# Patient Record
Sex: MALE | Race: WHITE | NOT HISPANIC OR LATINO | Employment: FULL TIME | URBAN - METROPOLITAN AREA
[De-identification: names, ages, dates, MRNs, and addresses within clinical notes are randomized per-mention and may not be internally consistent; named-entity substitution may affect disease eponyms.]

---

## 2024-02-20 ENCOUNTER — OFFICE VISIT (OUTPATIENT)
Dept: PRIMARY CARE | Facility: CLINIC | Age: 34
End: 2024-02-20
Payer: COMMERCIAL

## 2024-02-20 VITALS
HEIGHT: 70 IN | OXYGEN SATURATION: 96 % | WEIGHT: 186 LBS | HEART RATE: 80 BPM | DIASTOLIC BLOOD PRESSURE: 62 MMHG | BODY MASS INDEX: 26.63 KG/M2 | SYSTOLIC BLOOD PRESSURE: 100 MMHG

## 2024-02-20 DIAGNOSIS — R82.90 BAD ODOR OF URINE: ICD-10-CM

## 2024-02-20 DIAGNOSIS — Z00.00 HEALTH CARE MAINTENANCE: Primary | ICD-10-CM

## 2024-02-20 DIAGNOSIS — R07.81 PLEURITIC CHEST PAIN: ICD-10-CM

## 2024-02-20 LAB
POC APPEARANCE, URINE: CLEAR
POC BILIRUBIN, URINE: NEGATIVE
POC BLOOD, URINE: NEGATIVE
POC COLOR, URINE: YELLOW
POC GLUCOSE, URINE: NEGATIVE MG/DL
POC KETONES, URINE: NEGATIVE MG/DL
POC LEUKOCYTES, URINE: NEGATIVE
POC NITRITE,URINE: NEGATIVE
POC PH, URINE: 7 PH
POC PROTEIN, URINE: NEGATIVE MG/DL
POC SPECIFIC GRAVITY, URINE: 1.01
POC UROBILINOGEN, URINE: 0.2 EU/DL

## 2024-02-20 PROCEDURE — 99395 PREV VISIT EST AGE 18-39: CPT | Performed by: INTERNAL MEDICINE

## 2024-02-20 PROCEDURE — 81002 URINALYSIS NONAUTO W/O SCOPE: CPT | Performed by: INTERNAL MEDICINE

## 2024-02-20 PROCEDURE — 4004F PT TOBACCO SCREEN RCVD TLK: CPT | Performed by: INTERNAL MEDICINE

## 2024-02-20 RX ORDER — CLONAZEPAM 0.5 MG/1
0.5 TABLET ORAL DAILY
COMMUNITY
Start: 2024-02-17

## 2024-02-20 RX ORDER — LAMOTRIGINE 200 MG/1
400 TABLET ORAL DAILY
COMMUNITY
Start: 2024-01-26

## 2024-02-20 RX ORDER — QUETIAPINE 300 MG/1
600 TABLET, FILM COATED, EXTENDED RELEASE ORAL DAILY
COMMUNITY
Start: 2024-01-26

## 2024-02-20 ASSESSMENT — PATIENT HEALTH QUESTIONNAIRE - PHQ9
1. LITTLE INTEREST OR PLEASURE IN DOING THINGS: NOT AT ALL
2. FEELING DOWN, DEPRESSED OR HOPELESS: NOT AT ALL
SUM OF ALL RESPONSES TO PHQ9 QUESTIONS 1 AND 2: 0

## 2024-02-20 NOTE — PROGRESS NOTES
"Reason for Visit: Annual Physical Exam  Allergies and Medications  Cephalosporins  Current Outpatient Medications   Medication Instructions    clonazePAM (KLONOPIN) 0.5 mg, oral, Daily    lamoTRIgine (LAMICTAL) 400 mg, oral, Daily    QUEtiapine XR (SEROQUEL XR) 600 mg, oral, Daily     HPI:    33-year-old patient presented to the office today to establish care examination.  Patient just walked from [place] to [facility] and is planning on moving back.    Patient is actively followed by psychiatry for his anxiety he seems to be stable currently on his current medications I believe he also has bipolar disorder.    He denies any chest pain or difficulty breathing not even on exertion he denies abdominal pain nausea vomiting changes in the bowel movements or blood in the he denies any burning sensation increased frequency but he feels that the urine does not look normal and sometimes it is foamy and sometimes there is a green color in it.  He is a bit concerned.  No blood noted in the urine.  No weight loss loss of appetite.      ROS otherwise negative aside from what was mentioned above in HPI.    Vitals  /62 (BP Location: Left arm, Patient Position: Sitting)   Pulse 80   Ht 1.778 m (5' 10\")   Wt 84.4 kg (186 lb)   SpO2 96%   BMI 26.69 kg/m²   Body mass index is 26.69 kg/m².  Physical Exam  Physical Exam  Constitutional:       Appearance: Normal appearance.   HENT:      Head: Normocephalic and atraumatic.   Eyes:      Extraocular Movements: Extraocular movements intact.      Pupils: Pupils are equal, round, and reactive to light.   Cardiovascular:      Rate and Rhythm: Normal rate and regular rhythm.      Pulses: Normal pulses.      Heart sounds: Normal heart sounds.   Pulmonary:      Effort: Pulmonary effort is normal.      Breath sounds: Normal breath sounds.   Abdominal:      General: Abdomen is flat. Bowel sounds are normal.      Palpations: Abdomen is soft.   Neurological:      General: No " focal deficit present.      Mental Status: He is alert. Mental status is at baseline.   Psychiatric:         Mood and Affect: Mood normal.         Behavior: Behavior normal.         Thought Content: Thought content normal.         Judgment: Judgment normal.          Active Problem List    Comprehensive Medical/Surgical/Social/Family History  No past medical history on file.  No past surgical history on file.  Social History     Social History Narrative    Not on file   Patient continues to smoke and does not drink alcohol not much caffeine.  He is not  and no children.  No family history on file.   Family history:  Patient has 3 brothers in good health he has a grandfather on his mother side cancer he does not want to his father's family.    Assessment and Plan:  Problem List Items Addressed This Visit    None  Visit Diagnoses       Bad odor of urine        Relevant Orders    POCT UA (nonautomated) manually resulted (Completed)        33-year-old patient with the following issues.    1.  Concerns regarding urinary symptoms at this point his urine analysis came back negative for infection, we are going to review his blood work and do a referral to see urology to address this specific concern that he is describing today regarding his urine.    2.  Occasional episodes of sharp shooting pain in the chest very infrequent yet symptoms he does feel that it does happen when he takes a deep breath.  We are going to obtain a D-dimer it is likely musculoskeletal and is not related to exertion we will discuss results with him once available.    3.  Health maintenance issues, lab work will be requested patient is up-to-date on his preventive care and we discussed the results of the most.    Disposition I will see the patient back in the office in 1 year for repeat physical in 3 to 6 months for follow-up.  All his questions were addressed no other active issues or concerns.

## 2024-02-21 ENCOUNTER — LAB (OUTPATIENT)
Dept: LAB | Facility: LAB | Age: 34
End: 2024-02-21
Payer: COMMERCIAL

## 2024-02-21 DIAGNOSIS — Z00.00 HEALTH CARE MAINTENANCE: ICD-10-CM

## 2024-02-21 DIAGNOSIS — R07.81 PLEURITIC CHEST PAIN: ICD-10-CM

## 2024-02-21 LAB
ALBUMIN SERPL BCP-MCNC: 4.2 G/DL (ref 3.4–5)
ALP SERPL-CCNC: 108 U/L (ref 33–120)
ALT SERPL W P-5'-P-CCNC: 24 U/L (ref 10–52)
ANION GAP SERPL CALC-SCNC: 11 MMOL/L (ref 10–20)
AST SERPL W P-5'-P-CCNC: 17 U/L (ref 9–39)
BILIRUB SERPL-MCNC: 0.3 MG/DL (ref 0–1.2)
BUN SERPL-MCNC: 12 MG/DL (ref 6–23)
CALCIUM SERPL-MCNC: 9.2 MG/DL (ref 8.6–10.3)
CHLORIDE SERPL-SCNC: 107 MMOL/L (ref 98–107)
CHOLEST SERPL-MCNC: 206 MG/DL (ref 0–199)
CHOLESTEROL/HDL RATIO: 4.2
CO2 SERPL-SCNC: 28 MMOL/L (ref 21–32)
CREAT SERPL-MCNC: 1.34 MG/DL (ref 0.5–1.3)
D DIMER PPP FEU-MCNC: <215 NG/ML FEU
EGFRCR SERPLBLD CKD-EPI 2021: 71 ML/MIN/1.73M*2
ERYTHROCYTE [DISTWIDTH] IN BLOOD BY AUTOMATED COUNT: 11.9 % (ref 11.5–14.5)
GLUCOSE SERPL-MCNC: 102 MG/DL (ref 74–99)
HCT VFR BLD AUTO: 41 % (ref 41–52)
HDLC SERPL-MCNC: 48.9 MG/DL
HGB BLD-MCNC: 14.1 G/DL (ref 13.5–17.5)
LDLC SERPL CALC-MCNC: 118 MG/DL
MCH RBC QN AUTO: 30.8 PG (ref 26–34)
MCHC RBC AUTO-ENTMCNC: 34.4 G/DL (ref 32–36)
MCV RBC AUTO: 90 FL (ref 80–100)
NON HDL CHOLESTEROL: 157 MG/DL (ref 0–149)
NRBC BLD-RTO: 0 /100 WBCS (ref 0–0)
PLATELET # BLD AUTO: 256 X10*3/UL (ref 150–450)
POTASSIUM SERPL-SCNC: 4.4 MMOL/L (ref 3.5–5.3)
PROT SERPL-MCNC: 6.4 G/DL (ref 6.4–8.2)
RBC # BLD AUTO: 4.58 X10*6/UL (ref 4.5–5.9)
SODIUM SERPL-SCNC: 142 MMOL/L (ref 136–145)
TRIGL SERPL-MCNC: 196 MG/DL (ref 0–149)
TSH SERPL-ACNC: 2.93 MIU/L (ref 0.44–3.98)
VLDL: 39 MG/DL (ref 0–40)
WBC # BLD AUTO: 6.6 X10*3/UL (ref 4.4–11.3)

## 2024-02-21 PROCEDURE — 80053 COMPREHEN METABOLIC PANEL: CPT

## 2024-02-21 PROCEDURE — 36415 COLL VENOUS BLD VENIPUNCTURE: CPT

## 2024-02-21 PROCEDURE — 85027 COMPLETE CBC AUTOMATED: CPT

## 2024-02-21 PROCEDURE — 80061 LIPID PANEL: CPT

## 2024-02-21 PROCEDURE — 84443 ASSAY THYROID STIM HORMONE: CPT

## 2024-02-21 PROCEDURE — 85379 FIBRIN DEGRADATION QUANT: CPT

## 2024-02-22 DIAGNOSIS — R79.89 ELEVATED SERUM CREATININE: Primary | ICD-10-CM

## 2024-02-27 ENCOUNTER — OFFICE VISIT (OUTPATIENT)
Dept: NEPHROLOGY | Facility: CLINIC | Age: 34
End: 2024-02-27
Payer: COMMERCIAL

## 2024-02-27 VITALS
BODY MASS INDEX: 26.2 KG/M2 | HEART RATE: 92 BPM | DIASTOLIC BLOOD PRESSURE: 60 MMHG | WEIGHT: 183 LBS | HEIGHT: 70 IN | SYSTOLIC BLOOD PRESSURE: 102 MMHG

## 2024-02-27 DIAGNOSIS — R79.89 ELEVATED SERUM CREATININE: ICD-10-CM

## 2024-02-27 PROCEDURE — 4004F PT TOBACCO SCREEN RCVD TLK: CPT | Performed by: INTERNAL MEDICINE

## 2024-02-27 PROCEDURE — 99203 OFFICE O/P NEW LOW 30 MIN: CPT | Performed by: INTERNAL MEDICINE

## 2024-02-27 NOTE — PROGRESS NOTES
Gage Hernandes   33 y.o.      Vitals:    02/27/24 1516   Weight: 83 kg (183 lb)      MRN/Room: [id number]/Room/bed info not found      History Of Present Illness  Gage Hernandes is a 33 y.o. male presenting with high creatinine level.     Past Medical History  He has no past medical history on file.    Surgical History  He has no past surgical history on file.     Social History  He reports that he has quit smoking. His smoking use included cigarettes. He uses smokeless tobacco. He reports that he does not currently use alcohol. He reports that he does not use drugs.    Family History  No family history on file.     Allergies  Cephalosporins    Meds:         Current Outpatient Medications   Medication Sig Dispense Refill    clonazePAM (KlonoPIN) 0.5 mg tablet Take 1 tablet (0.5 mg) by mouth once daily.      lamoTRIgine (LaMICtal) 200 mg tablet Take 2 tablets (400 mg) by mouth once daily.      QUEtiapine XR (SEROquel XR) 300 mg 24 hr tablet Take 2 tablets (600 mg) by mouth once daily.       No current facility-administered medications for this visit.         ROS:  The patient is awake and oriented. No dizziness or lightheadedness. No chills and no fever. No headaches. No nausea and no vomiting. No shortness of breath. No cough. No sputum. No chest pain. No chest tightness. No abdominal pain. No diarrhea and no constipation. No hematemesis or hemoptysis. No hematuria. No rectal bleeding. No melena. No epistaxis. No urinary symptoms. No flank pain. No leg edema. No leg pain. No weakness. No itching. Overall, the rest of the review of systems is also negative.  12 point review of systems otherwise negative as stated in HPI.        Physical Exam:        Vitals:    02/27/24 1516   BP: 102/60   Pulse: 92     General: The patient is awake, oriented, and is not in any distress.  Head and Neck: Normocephalic. No periorbital edema.  Respiratory: Symmetric air entry. Symmetric chest expansion.No respiratory  distress.  Cardiovascular: Symmetric peripheral pulses.  Skin: No maculopapular rash.  Musculoskeletal: No peripheral edema in both left and right upper extremities.  No edema in either left or right lower extremities.  Neuro Exam: Speech is fluent. Moves extremities.        Blood Labs:  No results found for this or any previous visit (from the past 24 hour(s)).   Lab Results   Component Value Date    GLUCOSE 102 (H) 02/21/2024    CALCIUM 9.2 02/21/2024     02/21/2024    K 4.4 02/21/2024    CO2 28 02/21/2024     02/21/2024    BUN 12 02/21/2024    CREATININE 1.34 (H) 02/21/2024       Imaging:        Assessment and Plan:  #1 elevated creatinine level.  Recent blood work showed creatinine level of 1.3.  The patient complains of urine discoloration and he states his urine is greenish in color.  I asked for microscopic urinalysis and spot urine protein to creatinine ratio.  Basic metabolic panel will be checked.  I asked for a kidney ultrasound.  Blood pressure is good.  No history of hypertension or diabetes.    I will see him in about 2 to 3 weeks for follow-up.    Poncho Crowley MD

## 2024-02-29 ENCOUNTER — HOSPITAL ENCOUNTER (OUTPATIENT)
Dept: RADIOLOGY | Facility: CLINIC | Age: 34
Discharge: HOME | End: 2024-02-29
Payer: COMMERCIAL

## 2024-02-29 ENCOUNTER — LAB (OUTPATIENT)
Dept: LAB | Facility: LAB | Age: 34
End: 2024-02-29
Payer: COMMERCIAL

## 2024-02-29 DIAGNOSIS — R79.89 ELEVATED SERUM CREATININE: ICD-10-CM

## 2024-02-29 LAB
ANION GAP SERPL CALC-SCNC: 12 MMOL/L (ref 10–20)
BUN SERPL-MCNC: 12 MG/DL (ref 6–23)
CALCIUM SERPL-MCNC: 9.7 MG/DL (ref 8.6–10.3)
CHLORIDE SERPL-SCNC: 102 MMOL/L (ref 98–107)
CO2 SERPL-SCNC: 29 MMOL/L (ref 21–32)
CREAT SERPL-MCNC: 1.41 MG/DL (ref 0.5–1.3)
CREAT UR-MCNC: 24.6 MG/DL (ref 20–370)
EGFRCR SERPLBLD CKD-EPI 2021: 67 ML/MIN/1.73M*2
GLUCOSE SERPL-MCNC: 104 MG/DL (ref 74–99)
POTASSIUM SERPL-SCNC: 4.2 MMOL/L (ref 3.5–5.3)
PROT UR-ACNC: <4 MG/DL (ref 5–25)
PROT/CREAT UR: ABNORMAL MG/G{CREAT}
RBC #/AREA URNS AUTO: NORMAL /HPF
SODIUM SERPL-SCNC: 139 MMOL/L (ref 136–145)
WBC #/AREA URNS AUTO: NORMAL /HPF

## 2024-02-29 PROCEDURE — 84156 ASSAY OF PROTEIN URINE: CPT

## 2024-02-29 PROCEDURE — 36415 COLL VENOUS BLD VENIPUNCTURE: CPT

## 2024-02-29 PROCEDURE — 82570 ASSAY OF URINE CREATININE: CPT

## 2024-02-29 PROCEDURE — 76770 US EXAM ABDO BACK WALL COMP: CPT

## 2024-02-29 PROCEDURE — 81001 URINALYSIS AUTO W/SCOPE: CPT

## 2024-02-29 PROCEDURE — 87086 URINE CULTURE/COLONY COUNT: CPT

## 2024-02-29 PROCEDURE — 80048 BASIC METABOLIC PNL TOTAL CA: CPT

## 2024-02-29 PROCEDURE — 76770 US EXAM ABDO BACK WALL COMP: CPT | Performed by: STUDENT IN AN ORGANIZED HEALTH CARE EDUCATION/TRAINING PROGRAM

## 2024-03-01 LAB — BACTERIA UR CULT: NO GROWTH

## 2024-03-11 ENCOUNTER — OFFICE VISIT (OUTPATIENT)
Dept: PRIMARY CARE | Facility: CLINIC | Age: 34
End: 2024-03-11
Payer: COMMERCIAL

## 2024-03-11 VITALS
OXYGEN SATURATION: 98 % | HEIGHT: 70 IN | SYSTOLIC BLOOD PRESSURE: 120 MMHG | DIASTOLIC BLOOD PRESSURE: 70 MMHG | WEIGHT: 178.2 LBS | HEART RATE: 81 BPM | BODY MASS INDEX: 25.51 KG/M2 | RESPIRATION RATE: 14 BRPM

## 2024-03-11 DIAGNOSIS — R82.90 URINE ABNORMALITY: Primary | ICD-10-CM

## 2024-03-11 DIAGNOSIS — R19.4 CHANGE IN BOWEL HABITS: ICD-10-CM

## 2024-03-11 DIAGNOSIS — R10.84 GENERALIZED ABDOMINAL PAIN: ICD-10-CM

## 2024-03-11 PROCEDURE — 99214 OFFICE O/P EST MOD 30 MIN: CPT | Performed by: NURSE PRACTITIONER

## 2024-03-11 PROCEDURE — 4004F PT TOBACCO SCREEN RCVD TLK: CPT | Performed by: NURSE PRACTITIONER

## 2024-03-11 NOTE — PROGRESS NOTES
"Subjective   Patient ID: Gage Hernandes is a 33 y.o. male who presents for GI Problem (Pt has c.o yellow stools, green urine, bad smelling urine, abdominal pain when hungry, loss of appetite, fluctuating weight, and bloating. Pt also admits to nausea with any alcoholic beverage. Admits to excessive hair loss.//Pt had US and blood work to check kidneys. ).    He complains of yellowish stool which is daily. The other thing is he describes a concentrated substance is leaking in while he is urinating. While urinating he states there is a cloudy substance that comes into his urine.  He states there is a sharp pain when he doesn't eat anything in the epigastric region. The only time he's really hungry is right after he takes his seroquel at night. Doesn't feel like acid reflux. Not a burning pain.  Pain is relieved with eating.  Not taking any new supplements.   Started 3-4 months ago. Didn't have insurance last year so had to wait until Jan to see anyone for this.    Went to nephrology and kidney US and urine testing was normal.          Review of Systems  otherwise negative aside from what was mentioned above in HPI.    Objective   /70   Pulse 81   Resp 14   Ht 1.778 m (5' 10\")   Wt 80.8 kg (178 lb 3.2 oz)   SpO2 98%   BMI 25.57 kg/m²     Physical Exam  Constitutional:       Appearance: Normal appearance.   Cardiovascular:      Rate and Rhythm: Normal rate and regular rhythm.   Pulmonary:      Effort: Pulmonary effort is normal.      Breath sounds: Normal breath sounds.   Abdominal:      General: Abdomen is flat. Bowel sounds are normal.      Palpations: Abdomen is soft. There is no mass.      Tenderness: There is no abdominal tenderness. There is no guarding.   Skin:     General: Skin is warm.   Neurological:      General: No focal deficit present.      Mental Status: He is alert and oriented to person, place, and time. Mental status is at baseline.   Psychiatric:         Mood and Affect: Mood normal.    "      Thought Content: Thought content normal.         Assessment/Plan   Problem List Items Addressed This Visit    None  Visit Diagnoses         Codes    Urine abnormality    -  Primary R82.90    Relevant Orders    Referral to Urology    Change in bowel habits     R19.4    Relevant Orders    Referral to Gastroenterology    Generalized abdominal pain     R10.84    Relevant Orders    Referral to Gastroenterology    US abdomen

## 2024-03-12 ENCOUNTER — ANESTHESIA (OUTPATIENT)
Dept: OPERATING ROOM | Facility: HOSPITAL | Age: 34
End: 2024-03-12
Payer: COMMERCIAL

## 2024-03-12 ENCOUNTER — HOSPITAL ENCOUNTER (EMERGENCY)
Facility: HOSPITAL | Age: 34
Discharge: HOME | End: 2024-03-12
Attending: EMERGENCY MEDICINE
Payer: COMMERCIAL

## 2024-03-12 ENCOUNTER — ANESTHESIA EVENT (OUTPATIENT)
Dept: OPERATING ROOM | Facility: HOSPITAL | Age: 34
End: 2024-03-12
Payer: COMMERCIAL

## 2024-03-12 ENCOUNTER — APPOINTMENT (OUTPATIENT)
Dept: RADIOLOGY | Facility: HOSPITAL | Age: 34
End: 2024-03-12
Payer: COMMERCIAL

## 2024-03-12 VITALS
WEIGHT: 170 LBS | RESPIRATION RATE: 18 BRPM | SYSTOLIC BLOOD PRESSURE: 115 MMHG | OXYGEN SATURATION: 97 % | DIASTOLIC BLOOD PRESSURE: 65 MMHG | HEART RATE: 51 BPM | TEMPERATURE: 97.2 F | BODY MASS INDEX: 24.34 KG/M2 | HEIGHT: 70 IN

## 2024-03-12 DIAGNOSIS — N20.1 URETERAL STONE: Primary | ICD-10-CM

## 2024-03-12 DIAGNOSIS — N20.0 NEPHROLITHIASIS: ICD-10-CM

## 2024-03-12 LAB
ALBUMIN SERPL BCP-MCNC: 4.7 G/DL (ref 3.4–5)
ALP SERPL-CCNC: 92 U/L (ref 33–120)
ALT SERPL W P-5'-P-CCNC: 19 U/L (ref 10–52)
ANION GAP SERPL CALC-SCNC: 11 MMOL/L (ref 10–20)
APPEARANCE UR: CLEAR
AST SERPL W P-5'-P-CCNC: 17 U/L (ref 9–39)
BASOPHILS # BLD AUTO: 0.05 X10*3/UL (ref 0–0.1)
BASOPHILS NFR BLD AUTO: 0.8 %
BILIRUB SERPL-MCNC: 0.7 MG/DL (ref 0–1.2)
BILIRUB UR STRIP.AUTO-MCNC: NEGATIVE MG/DL
BUN SERPL-MCNC: 16 MG/DL (ref 6–23)
CALCIUM SERPL-MCNC: 9.4 MG/DL (ref 8.6–10.3)
CHLORIDE SERPL-SCNC: 103 MMOL/L (ref 98–107)
CO2 SERPL-SCNC: 24 MMOL/L (ref 21–32)
COLOR UR: YELLOW
CREAT SERPL-MCNC: 1.39 MG/DL (ref 0.5–1.3)
CREAT UR-MCNC: 118.7 MG/DL (ref 20–370)
CREAT UR-MCNC: 118.7 MG/DL (ref 20–370)
EGFRCR SERPLBLD CKD-EPI 2021: 68 ML/MIN/1.73M*2
EOSINOPHIL # BLD AUTO: 0.18 X10*3/UL (ref 0–0.7)
EOSINOPHIL NFR BLD AUTO: 2.9 %
ERYTHROCYTE [DISTWIDTH] IN BLOOD BY AUTOMATED COUNT: 11.9 % (ref 11.5–14.5)
GLUCOSE SERPL-MCNC: 113 MG/DL (ref 74–99)
GLUCOSE UR STRIP.AUTO-MCNC: NEGATIVE MG/DL
HCT VFR BLD AUTO: 40 % (ref 41–52)
HGB BLD-MCNC: 14 G/DL (ref 13.5–17.5)
HOLD SPECIMEN: NORMAL
IMM GRANULOCYTES # BLD AUTO: 0.01 X10*3/UL (ref 0–0.7)
IMM GRANULOCYTES NFR BLD AUTO: 0.2 % (ref 0–0.9)
KETONES UR STRIP.AUTO-MCNC: NEGATIVE MG/DL
LEUKOCYTE ESTERASE UR QL STRIP.AUTO: NEGATIVE
LIPASE SERPL-CCNC: 31 U/L (ref 9–82)
LYMPHOCYTES # BLD AUTO: 2.36 X10*3/UL (ref 1.2–4.8)
LYMPHOCYTES NFR BLD AUTO: 37.5 %
MCH RBC QN AUTO: 30.5 PG (ref 26–34)
MCHC RBC AUTO-ENTMCNC: 35 G/DL (ref 32–36)
MCV RBC AUTO: 87 FL (ref 80–100)
MONOCYTES # BLD AUTO: 0.52 X10*3/UL (ref 0.1–1)
MONOCYTES NFR BLD AUTO: 8.3 %
NEUTROPHILS # BLD AUTO: 3.17 X10*3/UL (ref 1.2–7.7)
NEUTROPHILS NFR BLD AUTO: 50.3 %
NITRITE UR QL STRIP.AUTO: NEGATIVE
NRBC BLD-RTO: 0 /100 WBCS (ref 0–0)
PH UR STRIP.AUTO: 5 [PH]
PLATELET # BLD AUTO: 280 X10*3/UL (ref 150–450)
POTASSIUM SERPL-SCNC: 3.8 MMOL/L (ref 3.5–5.3)
PROT SERPL-MCNC: 7 G/DL (ref 6.4–8.2)
PROT UR STRIP.AUTO-MCNC: NEGATIVE MG/DL
PROT UR-ACNC: 10 MG/DL (ref 5–25)
PROT/CREAT UR: 0.08 MG/MG CREAT (ref 0–0.17)
RBC # BLD AUTO: 4.59 X10*6/UL (ref 4.5–5.9)
RBC # UR STRIP.AUTO: ABNORMAL /UL
RBC #/AREA URNS AUTO: ABNORMAL /HPF
SODIUM SERPL-SCNC: 134 MMOL/L (ref 136–145)
SODIUM UR-SCNC: 42 MMOL/L
SODIUM/CREAT UR-RTO: 35 MMOL/G CREAT
SP GR UR STRIP.AUTO: 1.03
UROBILINOGEN UR STRIP.AUTO-MCNC: <2 MG/DL
WBC # BLD AUTO: 6.3 X10*3/UL (ref 4.4–11.3)
WBC #/AREA URNS AUTO: ABNORMAL /HPF

## 2024-03-12 PROCEDURE — 99284 EMERGENCY DEPT VISIT MOD MDM: CPT

## 2024-03-12 PROCEDURE — 84156 ASSAY OF PROTEIN URINE: CPT

## 2024-03-12 PROCEDURE — C2617 STENT, NON-COR, TEM W/O DEL: HCPCS | Performed by: UROLOGY

## 2024-03-12 PROCEDURE — 2720000007 HC OR 272 NO HCPCS: Performed by: UROLOGY

## 2024-03-12 PROCEDURE — 2500000004 HC RX 250 GENERAL PHARMACY W/ HCPCS (ALT 636 FOR OP/ED)

## 2024-03-12 PROCEDURE — 3600000008 HC OR TIME - EACH INCREMENTAL 1 MINUTE - PROCEDURE LEVEL THREE: Performed by: UROLOGY

## 2024-03-12 PROCEDURE — 7100000002 HC RECOVERY ROOM TIME - EACH INCREMENTAL 1 MINUTE: Performed by: UROLOGY

## 2024-03-12 PROCEDURE — 2500000004 HC RX 250 GENERAL PHARMACY W/ HCPCS (ALT 636 FOR OP/ED): Performed by: ANESTHESIOLOGY

## 2024-03-12 PROCEDURE — 2550000001 HC RX 255 CONTRASTS: Performed by: UROLOGY

## 2024-03-12 PROCEDURE — C1758 CATHETER, URETERAL: HCPCS | Performed by: UROLOGY

## 2024-03-12 PROCEDURE — 2550000001 HC RX 255 CONTRASTS: Performed by: EMERGENCY MEDICINE

## 2024-03-12 PROCEDURE — A52332 PR CYSTOSCOPY,INSERT URETERAL STENT: Performed by: NURSE ANESTHETIST, CERTIFIED REGISTERED

## 2024-03-12 PROCEDURE — 96375 TX/PRO/DX INJ NEW DRUG ADDON: CPT | Mod: 59 | Performed by: INTERNAL MEDICINE

## 2024-03-12 PROCEDURE — 7100000009 HC PHASE TWO TIME - INITIAL BASE CHARGE: Performed by: UROLOGY

## 2024-03-12 PROCEDURE — A52332 PR CYSTOSCOPY,INSERT URETERAL STENT: Performed by: ANESTHESIOLOGY

## 2024-03-12 PROCEDURE — 36415 COLL VENOUS BLD VENIPUNCTURE: CPT | Performed by: EMERGENCY MEDICINE

## 2024-03-12 PROCEDURE — 7100000010 HC PHASE TWO TIME - EACH INCREMENTAL 1 MINUTE: Performed by: UROLOGY

## 2024-03-12 PROCEDURE — 74177 CT ABD & PELVIS W/CONTRAST: CPT

## 2024-03-12 PROCEDURE — C1894 INTRO/SHEATH, NON-LASER: HCPCS | Performed by: UROLOGY

## 2024-03-12 PROCEDURE — 81001 URINALYSIS AUTO W/SCOPE: CPT | Performed by: EMERGENCY MEDICINE

## 2024-03-12 PROCEDURE — 84075 ASSAY ALKALINE PHOSPHATASE: CPT | Performed by: EMERGENCY MEDICINE

## 2024-03-12 PROCEDURE — 96374 THER/PROPH/DIAG INJ IV PUSH: CPT | Mod: 59 | Performed by: INTERNAL MEDICINE

## 2024-03-12 PROCEDURE — 2500000004 HC RX 250 GENERAL PHARMACY W/ HCPCS (ALT 636 FOR OP/ED): Performed by: EMERGENCY MEDICINE

## 2024-03-12 PROCEDURE — 99285 EMERGENCY DEPT VISIT HI MDM: CPT | Mod: 25 | Performed by: INTERNAL MEDICINE

## 2024-03-12 PROCEDURE — 99285 EMERGENCY DEPT VISIT HI MDM: CPT | Performed by: EMERGENCY MEDICINE

## 2024-03-12 PROCEDURE — 3700000002 HC GENERAL ANESTHESIA TIME - EACH INCREMENTAL 1 MINUTE: Performed by: UROLOGY

## 2024-03-12 PROCEDURE — 83690 ASSAY OF LIPASE: CPT

## 2024-03-12 PROCEDURE — 2780000003 HC OR 278 NO HCPCS: Performed by: UROLOGY

## 2024-03-12 PROCEDURE — 3600000003 HC OR TIME - INITIAL BASE CHARGE - PROCEDURE LEVEL THREE: Performed by: UROLOGY

## 2024-03-12 PROCEDURE — 2500000005 HC RX 250 GENERAL PHARMACY W/O HCPCS: Performed by: NURSE ANESTHETIST, CERTIFIED REGISTERED

## 2024-03-12 PROCEDURE — 85025 COMPLETE CBC W/AUTO DIFF WBC: CPT | Performed by: EMERGENCY MEDICINE

## 2024-03-12 PROCEDURE — C1769 GUIDE WIRE: HCPCS | Performed by: UROLOGY

## 2024-03-12 PROCEDURE — 84300 ASSAY OF URINE SODIUM: CPT

## 2024-03-12 PROCEDURE — 2500000004 HC RX 250 GENERAL PHARMACY W/ HCPCS (ALT 636 FOR OP/ED): Performed by: NURSE ANESTHETIST, CERTIFIED REGISTERED

## 2024-03-12 PROCEDURE — 3700000001 HC GENERAL ANESTHESIA TIME - INITIAL BASE CHARGE: Performed by: UROLOGY

## 2024-03-12 PROCEDURE — 76000 FLUOROSCOPY <1 HR PHYS/QHP: CPT

## 2024-03-12 PROCEDURE — 2500000001 HC RX 250 WO HCPCS SELF ADMINISTERED DRUGS (ALT 637 FOR MEDICARE OP): Performed by: ANESTHESIOLOGY

## 2024-03-12 PROCEDURE — 7100000001 HC RECOVERY ROOM TIME - INITIAL BASE CHARGE: Performed by: UROLOGY

## 2024-03-12 DEVICE — URETERAL STENT
Type: IMPLANTABLE DEVICE | Site: URETER | Status: FUNCTIONAL
Brand: CONTOUR™

## 2024-03-12 RX ORDER — ONDANSETRON HYDROCHLORIDE 2 MG/ML
4 INJECTION, SOLUTION INTRAVENOUS EVERY 8 HOURS PRN
Status: CANCELLED | OUTPATIENT
Start: 2024-03-12

## 2024-03-12 RX ORDER — ALBUTEROL SULFATE 0.83 MG/ML
2.5 SOLUTION RESPIRATORY (INHALATION)
Status: DISCONTINUED | OUTPATIENT
Start: 2024-03-12 | End: 2024-03-12 | Stop reason: HOSPADM

## 2024-03-12 RX ORDER — CIPROFLOXACIN 2 MG/ML
INJECTION, SOLUTION INTRAVENOUS CONTINUOUS PRN
Status: DISCONTINUED | OUTPATIENT
Start: 2024-03-12 | End: 2024-03-12

## 2024-03-12 RX ORDER — FENTANYL CITRATE 50 UG/ML
INJECTION, SOLUTION INTRAMUSCULAR; INTRAVENOUS AS NEEDED
Status: DISCONTINUED | OUTPATIENT
Start: 2024-03-12 | End: 2024-03-12

## 2024-03-12 RX ORDER — SODIUM CHLORIDE 9 MG/ML
100 INJECTION, SOLUTION INTRAVENOUS CONTINUOUS
Status: DISCONTINUED | OUTPATIENT
Start: 2024-03-12 | End: 2024-03-12 | Stop reason: HOSPADM

## 2024-03-12 RX ORDER — KETOROLAC TROMETHAMINE 15 MG/ML
15 INJECTION, SOLUTION INTRAMUSCULAR; INTRAVENOUS ONCE
Status: COMPLETED | OUTPATIENT
Start: 2024-03-12 | End: 2024-03-12

## 2024-03-12 RX ORDER — MORPHINE SULFATE 4 MG/ML
4 INJECTION, SOLUTION INTRAMUSCULAR; INTRAVENOUS ONCE
Status: COMPLETED | OUTPATIENT
Start: 2024-03-12 | End: 2024-03-12

## 2024-03-12 RX ORDER — DIPHENHYDRAMINE HYDROCHLORIDE 50 MG/ML
12.5 INJECTION INTRAMUSCULAR; INTRAVENOUS ONCE AS NEEDED
Status: DISCONTINUED | OUTPATIENT
Start: 2024-03-12 | End: 2024-03-12 | Stop reason: HOSPADM

## 2024-03-12 RX ORDER — CLONAZEPAM 0.5 MG/1
0.5 TABLET ORAL DAILY
Status: CANCELLED | OUTPATIENT
Start: 2024-03-12

## 2024-03-12 RX ORDER — MIDAZOLAM HYDROCHLORIDE 1 MG/ML
INJECTION, SOLUTION INTRAMUSCULAR; INTRAVENOUS AS NEEDED
Status: DISCONTINUED | OUTPATIENT
Start: 2024-03-12 | End: 2024-03-12

## 2024-03-12 RX ORDER — CIPROFLOXACIN 2 MG/ML
400 INJECTION, SOLUTION INTRAVENOUS ONCE
Status: CANCELLED | OUTPATIENT
Start: 2024-03-12

## 2024-03-12 RX ORDER — QUETIAPINE FUMARATE 25 MG/1
100 TABLET, FILM COATED ORAL 3 TIMES DAILY
Status: CANCELLED | OUTPATIENT
Start: 2024-03-12

## 2024-03-12 RX ORDER — TAMSULOSIN HYDROCHLORIDE 0.4 MG/1
0.4 CAPSULE ORAL DAILY
Status: DISCONTINUED | OUTPATIENT
Start: 2024-03-12 | End: 2024-03-12 | Stop reason: HOSPADM

## 2024-03-12 RX ORDER — PROPOFOL 10 MG/ML
INJECTION, EMULSION INTRAVENOUS AS NEEDED
Status: DISCONTINUED | OUTPATIENT
Start: 2024-03-12 | End: 2024-03-12

## 2024-03-12 RX ORDER — PHENAZOPYRIDINE HYDROCHLORIDE 100 MG/1
95 TABLET, FILM COATED ORAL
Status: DISCONTINUED | OUTPATIENT
Start: 2024-03-12 | End: 2024-03-12 | Stop reason: HOSPADM

## 2024-03-12 RX ORDER — HYDROMORPHONE HYDROCHLORIDE 1 MG/ML
INJECTION, SOLUTION INTRAMUSCULAR; INTRAVENOUS; SUBCUTANEOUS
Status: COMPLETED
Start: 2024-03-12 | End: 2024-03-12

## 2024-03-12 RX ORDER — OXYCODONE HYDROCHLORIDE 5 MG/1
5 TABLET ORAL EVERY 4 HOURS PRN
Status: DISCONTINUED | OUTPATIENT
Start: 2024-03-12 | End: 2024-03-12

## 2024-03-12 RX ORDER — ONDANSETRON 4 MG/1
4 TABLET, FILM COATED ORAL EVERY 8 HOURS PRN
Status: CANCELLED | OUTPATIENT
Start: 2024-03-12

## 2024-03-12 RX ORDER — HYDROMORPHONE HYDROCHLORIDE 1 MG/ML
1 INJECTION, SOLUTION INTRAMUSCULAR; INTRAVENOUS; SUBCUTANEOUS EVERY 5 MIN PRN
Status: DISCONTINUED | OUTPATIENT
Start: 2024-03-12 | End: 2024-03-12 | Stop reason: HOSPADM

## 2024-03-12 RX ORDER — LIDOCAINE HYDROCHLORIDE 20 MG/ML
INJECTION, SOLUTION INFILTRATION; PERINEURAL AS NEEDED
Status: DISCONTINUED | OUTPATIENT
Start: 2024-03-12 | End: 2024-03-12

## 2024-03-12 RX ORDER — HYDROMORPHONE HYDROCHLORIDE 1 MG/ML
1 INJECTION, SOLUTION INTRAMUSCULAR; INTRAVENOUS; SUBCUTANEOUS ONCE
Status: DISCONTINUED | OUTPATIENT
Start: 2024-03-12 | End: 2024-03-12

## 2024-03-12 RX ORDER — HYDRALAZINE HYDROCHLORIDE 20 MG/ML
5 INJECTION INTRAMUSCULAR; INTRAVENOUS EVERY 30 MIN PRN
Status: DISCONTINUED | OUTPATIENT
Start: 2024-03-12 | End: 2024-03-12 | Stop reason: HOSPADM

## 2024-03-12 RX ORDER — MIDAZOLAM HYDROCHLORIDE 1 MG/ML
1 INJECTION, SOLUTION INTRAMUSCULAR; INTRAVENOUS ONCE AS NEEDED
Status: DISCONTINUED | OUTPATIENT
Start: 2024-03-12 | End: 2024-03-12 | Stop reason: HOSPADM

## 2024-03-12 RX ORDER — HYDROCODONE BITARTRATE AND ACETAMINOPHEN 5; 325 MG/1; MG/1
1 TABLET ORAL EVERY 4 HOURS PRN
Status: DISCONTINUED | OUTPATIENT
Start: 2024-03-12 | End: 2024-03-12 | Stop reason: HOSPADM

## 2024-03-12 RX ORDER — OXYCODONE HYDROCHLORIDE 5 MG/1
10 TABLET ORAL EVERY 4 HOURS PRN
Status: DISCONTINUED | OUTPATIENT
Start: 2024-03-12 | End: 2024-03-12

## 2024-03-12 RX ORDER — METOCLOPRAMIDE HYDROCHLORIDE 5 MG/ML
10 INJECTION INTRAMUSCULAR; INTRAVENOUS ONCE AS NEEDED
Status: COMPLETED | OUTPATIENT
Start: 2024-03-12 | End: 2024-03-12

## 2024-03-12 RX ORDER — LABETALOL HYDROCHLORIDE 5 MG/ML
5 INJECTION, SOLUTION INTRAVENOUS
Status: DISCONTINUED | OUTPATIENT
Start: 2024-03-12 | End: 2024-03-12 | Stop reason: HOSPADM

## 2024-03-12 RX ORDER — ONDANSETRON HYDROCHLORIDE 2 MG/ML
4 INJECTION, SOLUTION INTRAVENOUS ONCE
Status: COMPLETED | OUTPATIENT
Start: 2024-03-12 | End: 2024-03-12

## 2024-03-12 RX ORDER — SODIUM CHLORIDE, SODIUM LACTATE, POTASSIUM CHLORIDE, CALCIUM CHLORIDE 600; 310; 30; 20 MG/100ML; MG/100ML; MG/100ML; MG/100ML
100 INJECTION, SOLUTION INTRAVENOUS CONTINUOUS
Status: DISCONTINUED | OUTPATIENT
Start: 2024-03-12 | End: 2024-03-12 | Stop reason: HOSPADM

## 2024-03-12 RX ORDER — ONDANSETRON HYDROCHLORIDE 2 MG/ML
INJECTION, SOLUTION INTRAVENOUS AS NEEDED
Status: DISCONTINUED | OUTPATIENT
Start: 2024-03-12 | End: 2024-03-12

## 2024-03-12 RX ORDER — HYDROCODONE BITARTRATE AND ACETAMINOPHEN 5; 325 MG/1; MG/1
1 TABLET ORAL 2 TIMES DAILY PRN
Qty: 14 TABLET | Refills: 0 | Status: SHIPPED | OUTPATIENT
Start: 2024-03-12 | End: 2024-03-19

## 2024-03-12 RX ORDER — FAMOTIDINE 10 MG/ML
20 INJECTION INTRAVENOUS ONCE
Status: COMPLETED | OUTPATIENT
Start: 2024-03-12 | End: 2024-03-12

## 2024-03-12 RX ORDER — NITROFURANTOIN 25; 75 MG/1; MG/1
100 CAPSULE ORAL 2 TIMES DAILY
Qty: 10 CAPSULE | Refills: 0 | Status: SHIPPED | OUTPATIENT
Start: 2024-03-12

## 2024-03-12 RX ORDER — IODIXANOL 320 MG/ML
INJECTION, SOLUTION INTRAVASCULAR AS NEEDED
Status: DISCONTINUED | OUTPATIENT
Start: 2024-03-12 | End: 2024-03-12 | Stop reason: HOSPADM

## 2024-03-12 RX ADMIN — ONDANSETRON 4 MG: 2 INJECTION, SOLUTION INTRAMUSCULAR; INTRAVENOUS at 16:20

## 2024-03-12 RX ADMIN — HYDROMORPHONE HYDROCHLORIDE 1 MG: 1 INJECTION, SOLUTION INTRAMUSCULAR; INTRAVENOUS; SUBCUTANEOUS at 17:56

## 2024-03-12 RX ADMIN — SODIUM CHLORIDE 1000 ML: 9 INJECTION, SOLUTION INTRAVENOUS at 10:11

## 2024-03-12 RX ADMIN — HYDROMORPHONE HYDROCHLORIDE 0.5 MG: 1 INJECTION, SOLUTION INTRAMUSCULAR; INTRAVENOUS; SUBCUTANEOUS at 16:58

## 2024-03-12 RX ADMIN — ONDANSETRON 4 MG: 2 INJECTION INTRAMUSCULAR; INTRAVENOUS at 10:10

## 2024-03-12 RX ADMIN — IOHEXOL 75 ML: 350 INJECTION, SOLUTION INTRAVENOUS at 11:10

## 2024-03-12 RX ADMIN — PROPOFOL 200 MG: 10 INJECTION, EMULSION INTRAVENOUS at 15:53

## 2024-03-12 RX ADMIN — HYDROMORPHONE HYDROCHLORIDE 1 MG: 1 INJECTION, SOLUTION INTRAMUSCULAR; INTRAVENOUS; SUBCUTANEOUS at 17:26

## 2024-03-12 RX ADMIN — PHENAZOPYRIDINE 100 MG: 100 TABLET ORAL at 17:04

## 2024-03-12 RX ADMIN — MORPHINE SULFATE 4 MG: 4 INJECTION, SOLUTION INTRAMUSCULAR; INTRAVENOUS at 10:11

## 2024-03-12 RX ADMIN — METOCLOPRAMIDE 10 MG: 5 INJECTION, SOLUTION INTRAMUSCULAR; INTRAVENOUS at 18:14

## 2024-03-12 RX ADMIN — ONDANSETRON 4 MG: 2 INJECTION, SOLUTION INTRAMUSCULAR; INTRAVENOUS at 19:00

## 2024-03-12 RX ADMIN — CIPROFLOXACIN: 400 INJECTION, SOLUTION INTRAVENOUS at 15:52

## 2024-03-12 RX ADMIN — MIDAZOLAM 2 MG: 1 INJECTION INTRAMUSCULAR; INTRAVENOUS at 15:53

## 2024-03-12 RX ADMIN — HYDROMORPHONE HYDROCHLORIDE: 1 INJECTION, SOLUTION INTRAMUSCULAR; INTRAVENOUS; SUBCUTANEOUS at 10:40

## 2024-03-12 RX ADMIN — FAMOTIDINE 20 MG: 10 INJECTION, SOLUTION INTRAVENOUS at 19:33

## 2024-03-12 RX ADMIN — SODIUM CHLORIDE 100 ML/HR: 9 INJECTION, SOLUTION INTRAVENOUS at 14:09

## 2024-03-12 RX ADMIN — FENTANYL CITRATE 100 MCG: 50 INJECTION, SOLUTION INTRAMUSCULAR; INTRAVENOUS at 15:53

## 2024-03-12 RX ADMIN — LIDOCAINE HYDROCHLORIDE 60 MG: 20 INJECTION, SOLUTION INFILTRATION; PERINEURAL at 15:53

## 2024-03-12 RX ADMIN — KETOROLAC TROMETHAMINE 15 MG: 15 INJECTION, SOLUTION INTRAMUSCULAR; INTRAVENOUS at 10:35

## 2024-03-12 SDOH — HEALTH STABILITY: MENTAL HEALTH: CURRENT SMOKER: 0

## 2024-03-12 ASSESSMENT — PAIN SCALES - GENERAL
PAINLEVEL_OUTOF10: 10 - WORST POSSIBLE PAIN
PAINLEVEL_OUTOF10: 10 - WORST POSSIBLE PAIN
PAINLEVEL_OUTOF10: 6
PAINLEVEL_OUTOF10: 4
PAINLEVEL_OUTOF10: 9
PAINLEVEL_OUTOF10: 8
PAINLEVEL_OUTOF10: 2
PAINLEVEL_OUTOF10: 0 - NO PAIN
PAINLEVEL_OUTOF10: 3
PAINLEVEL_OUTOF10: 0 - NO PAIN
PAINLEVEL_OUTOF10: 2

## 2024-03-12 ASSESSMENT — PAIN DESCRIPTION - PAIN TYPE
TYPE: ACUTE PAIN
TYPE: ACUTE PAIN

## 2024-03-12 ASSESSMENT — LIFESTYLE VARIABLES
EVER HAD A DRINK FIRST THING IN THE MORNING TO STEADY YOUR NERVES TO GET RID OF A HANGOVER: NO
HAVE PEOPLE ANNOYED YOU BY CRITICIZING YOUR DRINKING: NO
HAVE YOU EVER FELT YOU SHOULD CUT DOWN ON YOUR DRINKING: NO
EVER FELT BAD OR GUILTY ABOUT YOUR DRINKING: NO

## 2024-03-12 ASSESSMENT — PAIN DESCRIPTION - ONSET: ONSET: ONGOING

## 2024-03-12 ASSESSMENT — PAIN DESCRIPTION - PROGRESSION: CLINICAL_PROGRESSION: NOT CHANGED

## 2024-03-12 ASSESSMENT — COLUMBIA-SUICIDE SEVERITY RATING SCALE - C-SSRS
2. HAVE YOU ACTUALLY HAD ANY THOUGHTS OF KILLING YOURSELF?: NO
1. IN THE PAST MONTH, HAVE YOU WISHED YOU WERE DEAD OR WISHED YOU COULD GO TO SLEEP AND NOT WAKE UP?: NO
6. HAVE YOU EVER DONE ANYTHING, STARTED TO DO ANYTHING, OR PREPARED TO DO ANYTHING TO END YOUR LIFE?: NO

## 2024-03-12 ASSESSMENT — PAIN - FUNCTIONAL ASSESSMENT
PAIN_FUNCTIONAL_ASSESSMENT: 0-10

## 2024-03-12 ASSESSMENT — PAIN DESCRIPTION - ORIENTATION: ORIENTATION: LEFT

## 2024-03-12 ASSESSMENT — PAIN DESCRIPTION - DESCRIPTORS: DESCRIPTORS: SHOOTING;SHARP

## 2024-03-12 ASSESSMENT — PAIN DESCRIPTION - LOCATION: LOCATION: ABDOMEN

## 2024-03-12 ASSESSMENT — PAIN DESCRIPTION - FREQUENCY: FREQUENCY: CONSTANT/CONTINUOUS

## 2024-03-12 NOTE — ANESTHESIA POSTPROCEDURE EVALUATION
Patient: Gage Hernandes    Procedure Summary       Date: 03/12/24 Room / Location: [facility] OR 01 / Virtual [facility] OR    Anesthesia Start: 1543 Anesthesia Stop: 1633    Procedure: Cystoscopy with STENT PLACEMENT (Left) Diagnosis:       Calculus of kidney      (Calculus of kidney [N20.0])    Surgeons: Elmer Lowery MD Responsible Provider: Yohan Benson MD    Anesthesia Type: general ASA Status: 2            Anesthesia Type: general    Vitals Value Taken Time   /77 03/12/24 1634   Temp 36 03/12/24 1634   Pulse 61 03/12/24 1634   Resp 16 03/12/24 1634   SpO2 98 03/12/24 1634       Anesthesia Post Evaluation    Patient location during evaluation: PACU  Patient participation: waiting for patient participation  Level of consciousness: sleepy but conscious  Pain management: adequate  Airway patency: patent  Cardiovascular status: acceptable  Respiratory status: acceptable  Hydration status: acceptable  Postoperative Nausea and Vomiting: none        There were no known notable events for this encounter.

## 2024-03-12 NOTE — H&P (VIEW-ONLY)
Hpi as below    Ct shows 6mm prox left ureter stone that is +RO on ct   Admit for pain control  Creat stable but slightly high 1.3--1.4; no obvious infection      HISTORY OF PRESENT ILLNESS    HPI       PAST MEDICAL HISTORY   Medical History   History reviewed. No pertinent past medical history.           SURGICAL HISTORY     Surgical History   History reviewed. No pertinent surgical history.           CURRENT MEDICATIONS             Previous Medications     CLONAZEPAM (KLONOPIN) 0.5 MG TABLET    Take 1 tablet (0.5 mg) by mouth once daily.     LAMOTRIGINE (LAMICTAL) 200 MG TABLET    Take 2 tablets (400 mg) by mouth once daily.     QUETIAPINE XR (SEROQUEL XR) 300 MG 24 HR TABLET    Take 2 tablets (600 mg) by mouth once daily.         ALLERGIES     Cephalosporins     FAMILY HISTORY     Family History   No family history on file.           SOCIAL HISTORY        Social History   Social History            Socioeconomic History    Marital status: Single       Spouse name: None    Number of children: None    Years of education: None    Highest education level: None   Occupational History    None   Tobacco Use    Smoking status: Former       Types: Cigarettes    Smokeless tobacco: Current   Vaping Use    Vaping Use: Every day   Substance and Sexual Activity    Alcohol use: Not Currently    Drug use: Yes       Types: Marijuana       Comment: occassionally    Sexual activity: None   Other Topics Concern    None   Social History Narrative    None        Results for orders placed or performed during the hospital encounter of 03/12/24 (from the past 24 hour(s))   CBC and Auto Differential   Result Value Ref Range    WBC 6.3 4.4 - 11.3 x10*3/uL    nRBC 0.0 0.0 - 0.0 /100 WBCs    RBC 4.59 4.50 - 5.90 x10*6/uL    Hemoglobin 14.0 13.5 - 17.5 g/dL    Hematocrit 40.0 (L) 41.0 - 52.0 %    MCV 87 80 - 100 fL    MCH 30.5 26.0 - 34.0 pg    MCHC 35.0 32.0 - 36.0 g/dL    RDW 11.9 11.5 - 14.5 %    Platelets 280 150 - 450 x10*3/uL     Neutrophils % 50.3 40.0 - 80.0 %    Immature Granulocytes %, Automated 0.2 0.0 - 0.9 %    Lymphocytes % 37.5 13.0 - 44.0 %    Monocytes % 8.3 2.0 - 10.0 %    Eosinophils % 2.9 0.0 - 6.0 %    Basophils % 0.8 0.0 - 2.0 %    Neutrophils Absolute 3.17 1.20 - 7.70 x10*3/uL    Immature Granulocytes Absolute, Automated 0.01 0.00 - 0.70 x10*3/uL    Lymphocytes Absolute 2.36 1.20 - 4.80 x10*3/uL    Monocytes Absolute 0.52 0.10 - 1.00 x10*3/uL    Eosinophils Absolute 0.18 0.00 - 0.70 x10*3/uL    Basophils Absolute 0.05 0.00 - 0.10 x10*3/uL   Comprehensive metabolic panel   Result Value Ref Range    Glucose 113 (H) 74 - 99 mg/dL    Sodium 134 (L) 136 - 145 mmol/L    Potassium 3.8 3.5 - 5.3 mmol/L    Chloride 103 98 - 107 mmol/L    Bicarbonate 24 21 - 32 mmol/L    Anion Gap 11 10 - 20 mmol/L    Urea Nitrogen 16 6 - 23 mg/dL    Creatinine 1.39 (H) 0.50 - 1.30 mg/dL    eGFR 68 >60 mL/min/1.73m*2    Calcium 9.4 8.6 - 10.3 mg/dL    Albumin 4.7 3.4 - 5.0 g/dL    Alkaline Phosphatase 92 33 - 120 U/L    Total Protein 7.0 6.4 - 8.2 g/dL    AST 17 9 - 39 U/L    Bilirubin, Total 0.7 0.0 - 1.2 mg/dL    ALT 19 10 - 52 U/L   Lipase   Result Value Ref Range    Lipase 31 9 - 82 U/L   Urinalysis with Reflex Culture and Microscopic   Result Value Ref Range    Color, Urine Yellow Straw, Yellow    Appearance, Urine Clear Clear    Specific Gravity, Urine 1.031 1.005 - 1.035    pH, Urine 5.0 5.0, 5.5, 6.0, 6.5, 7.0, 7.5, 8.0    Protein, Urine NEGATIVE NEGATIVE mg/dL    Glucose, Urine NEGATIVE NEGATIVE mg/dL    Blood, Urine MODERATE (2+) (A) NEGATIVE    Ketones, Urine NEGATIVE NEGATIVE mg/dL    Bilirubin, Urine NEGATIVE NEGATIVE    Urobilinogen, Urine <2.0 <2.0 mg/dL    Nitrite, Urine NEGATIVE NEGATIVE    Leukocyte Esterase, Urine NEGATIVE NEGATIVE   Urinalysis Microscopic   Result Value Ref Range    WBC, Urine 1-5 1-5, NONE /HPF    RBC, Urine 6-10 (A) NONE, 1-2, 3-5 /HPF      CT abdomen pelvis w IV contrast    Result Date: 3/12/2024  Interpreted  By:  Remedios Coreas, STUDY: CT ABDOMEN PELVIS W IV CONTRAST; ;  3/12/2024 11:13 am   INDICATION: Signs/Symptoms:severe left flank pain, hx kidney stones.   COMPARISON: None.   ACCESSION NUMBER(S): [id number]   ORDERING CLINICIAN: GALO LIMON   TECHNIQUE: Imaging was performed from dome of the diaphragm through ischial tuberosities with axial, coronal and sagittal images reconstructed. Patient received 75 mL Omnipaque 350 intravenously.   FINDINGS: Lung bases are clear. No pleural effusions are noted. The visualized heart appears normal in size.   No mass is noted in the liver. There is no intra or extrahepatic biliary dilatation. Gallbladder is normal in appearance.   No mass or inflammation is noted involving the pancreas.   Spleen is normal in size with no focal mass noted.   Adrenal glands appear normal.   No stones or hydronephrosis are noted in the right kidney. Several small stones are present in the left kidney, measuring up to 3 mm. There is mild hydronephrosis with a 6 mm stone at the left ureteropelvic junction. Ureter is not dilated and there are no additional stones noted more distally. 1.6 cm cyst is present upper pole left kidney..   Bowel loops are nondilated. Appendix appears normal. No ascites, pneumoperitoneum or mesenteric inflammation is identified.   Aorta and vena cava are normal in size. There are no pathologically enlarged retroperitoneal, iliac or inguinal lymph nodes identified.   Urinary bladder is normal in appearance with no wall thickening.   Prostate gland is nonenlarged.   Tiny amount of fat herniates into the base of the umbilicus.   Osseous structures show no acute abnormality.       6 mm stone left ureteropelvic junction with mild left hydronephrosis   Several left renal stones measuring 3 mm or less     MACRO: None.   Signed by: Remedios Coreas 3/12/2024 11:37 AM Dictation workstation:   [id number]        A/p    Ct shows 6mm prox left ureter stone that is +RO on ct    Admit for pain control  Creat stable but slightly high 1.3--1.4; no obvious infection    Pt seen---wishes to proceed w/ left urs/stone package today; all r/b/a dw him at [facility] and alterniative of w.w. vs eswl d/w him as well    Hx eswl in remote past    Elmer Lowery MD

## 2024-03-12 NOTE — ANESTHESIA PREPROCEDURE EVALUATION
Patient: Gage Hernandes    Procedure Information       Date/Time: 03/12/24 1527    Procedure: Cystoscopy with Lithotripsy Laser (Left)    Location: [facility] OR 01 / [facility]    Surgeons: Elmer Lowery MD            Relevant Problems   /Renal   (+) Nephrolithiasis       Clinical information reviewed:   Tobacco  Allergies  Meds   Med Hx  Surg Hx   Fam Hx  Soc Hx        NPO Detail:  NPO/Void Status  Carbohydrate Drink Given Prior to Surgery? : N  Date of Last Liquid: 03/12/24  Time of Last Liquid: 0900  Date of Last Solid: 03/12/24  Time of Last Solid: 0600  Last Intake Type: Light meal (Had Banana. Single episode of vomiting late morning.)         Physical Exam    Airway  Mallampati: I  TM distance: >3 FB  Neck ROM: full     Cardiovascular - normal exam     Dental - normal exam     Pulmonary - normal exam     Abdominal - normal exam           Vitals:    03/12/24 1338   BP: 127/79   Pulse: 67   Resp: 18   Temp:    SpO2: 99%       History reviewed. No pertinent surgical history.  History reviewed. No pertinent past medical history.    Current Facility-Administered Medications:     HYDROmorphone (Dilaudid) injection 0.4 mg, 0.4 mg, intravenous, q4h PRN, Barry Ferriser,     sodium chloride 0.9% infusion, 100 mL/hr, intravenous, Continuous, Barry Savage, , Last Rate: 100 mL/hr at 03/12/24 1409, 100 mL/hr at 03/12/24 1409    tamsulosin (Flomax) 24 hr capsule 0.4 mg, 0.4 mg, oral, Daily, Barry Savage, DO    Current Outpatient Medications:     clonazePAM (KlonoPIN) 0.5 mg tablet, Take 1 tablet (0.5 mg) by mouth once daily., Disp: , Rfl:     lamoTRIgine (LaMICtal) 200 mg tablet, Take 2 tablets (400 mg) by mouth once daily., Disp: , Rfl:     QUEtiapine XR (SEROquel XR) 300 mg 24 hr tablet, Take 2 tablets (600 mg) by mouth once daily., Disp: , Rfl:   Prior to Admission medications    Medication Sig Start Date End Date Taking? Authorizing Provider   clonazePAM (KlonoPIN) 0.5 mg tablet Take 1 tablet  "(0.5 mg) by mouth once daily. 2/17/24   Historical Provider, MD   lamoTRIgine (LaMICtal) 200 mg tablet Take 2 tablets (400 mg) by mouth once daily. 1/26/24   Historical Provider, MD   QUEtiapine XR (SEROquel XR) 300 mg 24 hr tablet Take 2 tablets (600 mg) by mouth once daily. 1/26/24   Historical Provider, MD     Allergies   Allergen Reactions    Cephalosporins Rash     Ceclor - rash     Social History     Tobacco Use    Smoking status: Former     Types: Cigarettes    Smokeless tobacco: Current   Substance Use Topics    Alcohol use: Not Currently         Chemistry    Lab Results   Component Value Date/Time     (L) 03/12/2024 1007    K 3.8 03/12/2024 1007     03/12/2024 1007    CO2 24 03/12/2024 1007    BUN 16 03/12/2024 1007    CREATININE 1.39 (H) 03/12/2024 1007    Lab Results   Component Value Date/Time    CALCIUM 9.4 03/12/2024 1007    ALKPHOS 92 03/12/2024 1007    AST 17 03/12/2024 1007    ALT 19 03/12/2024 1007    BILITOT 0.7 03/12/2024 1007          Lab Results   Component Value Date/Time    WBC 6.3 03/12/2024 1007    HGB 14.0 03/12/2024 1007    HCT 40.0 (L) 03/12/2024 1007     03/12/2024 1007     No results found for: \"PROTIME\", \"PTT\", \"INR\"  No results found for this or any previous visit (from the past 4464 hour(s)).     Anesthesia Plan    History of general anesthesia?: yes  History of complications of general anesthesia?: no    ASA 2     general     The patient is not a current smoker.    intravenous induction   Anesthetic plan and risks discussed with patient.    Plan discussed with CRNA.      "

## 2024-03-12 NOTE — ED PROVIDER NOTES
EMERGENCY DEPARTMENT ENCOUNTER      Pt Name: Gage Hernandes  MRN: [id number]  Birthdate 1990  Date of evaluation: 3/12/2024  Provider: Blayne Khan MD    CHIEF COMPLAINT       Chief Complaint   Patient presents with    Abdominal Pain     Pt states flank pain started left side hour ago. Pt denies nausea, vomiting, diarrhea or fevers Pt denies difficulty urinating or hematuria.          HISTORY OF PRESENT ILLNESS    HPI  Patient is a 33-year-old male with a history of kidney stones presenting with severe left flank pain.  This began acutely overnight is progressively worsened since.  Pain is 10/10, sharp, nonradiating, without consistent leaving or exacerbating factors.  States it feels exactly like his prior kidney stones.  [place] and is nonaffiliated with any current urologist.  He also notes nausea with occasional vomiting and inability to tolerate oral intake.  He denies fever, chills, chest pain, shortness of breath, change in bowel or bladder.    Nursing Notes were reviewed.    PAST MEDICAL HISTORY   History reviewed. No pertinent past medical history.      SURGICAL HISTORY     History reviewed. No pertinent surgical history.      CURRENT MEDICATIONS       Previous Medications    CLONAZEPAM (KLONOPIN) 0.5 MG TABLET    Take 1 tablet (0.5 mg) by mouth once daily.    LAMOTRIGINE (LAMICTAL) 200 MG TABLET    Take 2 tablets (400 mg) by mouth once daily.    QUETIAPINE XR (SEROQUEL XR) 300 MG 24 HR TABLET    Take 2 tablets (600 mg) by mouth once daily.       ALLERGIES     Cephalosporins    FAMILY HISTORY     No family history on file.       SOCIAL HISTORY       Social History     Socioeconomic History    Marital status: Single     Spouse name: None    Number of children: None    Years of education: None    Highest education level: None   Occupational History    None   Tobacco Use    Smoking status: Former     Types: Cigarettes    Smokeless tobacco: Current   Vaping Use    Vaping Use:  Every day   Substance and Sexual Activity    Alcohol use: Not Currently    Drug use: Yes     Types: Marijuana     Comment: occassionally    Sexual activity: None   Other Topics Concern    None   Social History Narrative    None     Social Determinants of Health     Financial Resource Strain: Not on file   Food Insecurity: Not on file   Transportation Needs: Not on file   Physical Activity: Not on file   Stress: Not on file   Social Connections: Not on file   Intimate Partner Violence: Not on file   Housing Stability: Not on file       SCREENINGS                        PHYSICAL EXAM    (up to 7 for level 4, 8 or more for level 5)     ED Triage Vitals [03/12/24 0945]   Temperature Heart Rate Respirations BP   36.7 °C (98.1 °F) 73 18 135/83      Pulse Ox Temp Source Heart Rate Source Patient Position   98 % Temporal Monitor Sitting      BP Location FiO2 (%)     Right arm --       Physical Exam  Constitutional:       General: He is in acute distress.      Appearance: He is not toxic-appearing.   HENT:      Head: Normocephalic and atraumatic.      Mouth/Throat:      Mouth: Mucous membranes are moist.      Pharynx: Oropharynx is clear.   Eyes:      General: No scleral icterus.     Extraocular Movements: Extraocular movements intact.   Cardiovascular:      Rate and Rhythm: Normal rate and regular rhythm.      Heart sounds: Normal heart sounds.   Pulmonary:      Effort: Pulmonary effort is normal. No respiratory distress.      Breath sounds: Normal breath sounds.   Abdominal:      General: Abdomen is flat. There is no distension.      Palpations: Abdomen is soft.      Tenderness: There is left CVA tenderness.   Skin:     General: Skin is warm and dry.   Neurological:      General: No focal deficit present.      Mental Status: He is oriented to person, place, and time.          DIAGNOSTIC RESULTS     LABS:  Labs Reviewed   CBC WITH AUTO DIFFERENTIAL - Abnormal       Result Value    WBC 6.3      nRBC 0.0      RBC 4.59       Hemoglobin 14.0      Hematocrit 40.0 (*)     MCV 87      MCH 30.5      MCHC 35.0      RDW 11.9      Platelets 280      Neutrophils % 50.3      Immature Granulocytes %, Automated 0.2      Lymphocytes % 37.5      Monocytes % 8.3      Eosinophils % 2.9      Basophils % 0.8      Neutrophils Absolute 3.17      Immature Granulocytes Absolute, Automated 0.01      Lymphocytes Absolute 2.36      Monocytes Absolute 0.52      Eosinophils Absolute 0.18      Basophils Absolute 0.05     COMPREHENSIVE METABOLIC PANEL - Abnormal    Glucose 113 (*)     Sodium 134 (*)     Potassium 3.8      Chloride 103      Bicarbonate 24      Anion Gap 11      Urea Nitrogen 16      Creatinine 1.39 (*)     eGFR 68      Calcium 9.4      Albumin 4.7      Alkaline Phosphatase 92      Total Protein 7.0      AST 17      Bilirubin, Total 0.7      ALT 19     URINALYSIS WITH REFLEX CULTURE AND MICROSCOPIC - Abnormal    Color, Urine Yellow      Appearance, Urine Clear      Specific Gravity, Urine 1.031      pH, Urine 5.0      Protein, Urine NEGATIVE      Glucose, Urine NEGATIVE      Blood, Urine MODERATE (2+) (*)     Ketones, Urine NEGATIVE      Bilirubin, Urine NEGATIVE      Urobilinogen, Urine <2.0      Nitrite, Urine NEGATIVE      Leukocyte Esterase, Urine NEGATIVE     URINALYSIS MICROSCOPIC WITH REFLEX CULTURE - Abnormal    WBC, Urine 1-5      RBC, Urine 6-10 (*)    LIPASE - Normal    Lipase 31      Narrative:     Venipuncture immediately after or during the administration of Metamizole may lead to falsely low results. Testing should be performed immediately prior to Metamizole dosing.   URINALYSIS WITH REFLEX CULTURE AND MICROSCOPIC    Narrative:     The following orders were created for panel order Urinalysis with Reflex Culture and Microscopic.  Procedure                               Abnormality         Status                     ---------                               -----------         ------                     Urinalysis with Reflex  C...[[id number]]  Abnormal            Final result               Extra Urine Gray Tube[[id number]]                            In process                   Please view results for these tests on the individual orders.   EXTRA URINE GRAY TUBE   CREATININE, URINE RANDOM   PROTEIN, URINE RANDOM   SODIUM, URINE RANDOM       All other labs were within normal range or not returned as of this dictation.    Imaging  CT abdomen pelvis w IV contrast   Final Result   6 mm stone left ureteropelvic junction with mild left hydronephrosis        Several left renal stones measuring 3 mm or less             MACRO:   None.        Signed by: Remedios Coreas 3/12/2024 11:37 AM   Dictation workstation:   [id number]           Procedures  Procedures     EMERGENCY DEPARTMENT COURSE/MDM:     Diagnoses as of 03/12/24 1414   Ureteral stone        Medical Decision Making  History obtained from the patient.  Records including labs, imaging, notes reviewed.  Primary diagnostic consideration for recurrent obstructing stone.  Patient given morphine with no improvement in pain.  This was followed by Toradol without improvement either.  Zofran eventually resolved his nausea.  In the interim, urinalysis notable for moderate blood.  Hematology unremarkable.  Chemistry notable for elevated serum creatinine of 1.39, sodium of 134.  CT demonstrated an obstructing 6 mm ureteral stone with associated hydronephrosis.  Urology was consulted who recommended admission for pain control.  Patient was given Dilaudid with some improvement in his pain and agreed to this plan.  He subsequently admitted to the medicine service with urology as consult.    Patient and or family in agreement and understanding of treatment plan.  All questions answered.      I reviewed the case with the attending ED physician. The attending ED physician agrees with the plan. Patient and/or patient´s representative was counseled regarding labs, imaging, likely diagnosis, and plan. All  questions were answered.    ED Medications administered this visit:    Medications   oxyCODONE (Roxicodone) immediate release tablet 5 mg (has no administration in time range)   oxyCODONE (Roxicodone) immediate release tablet 10 mg (has no administration in time range)   HYDROmorphone (Dilaudid) injection 0.4 mg (has no administration in time range)   sodium chloride 0.9% infusion (100 mL/hr intravenous New Bag 3/12/24 1409)   morphine injection 4 mg (4 mg intravenous Given 3/12/24 1011)   ondansetron (Zofran) injection 4 mg (4 mg intravenous Given 3/12/24 1010)   sodium chloride 0.9 % bolus 1,000 mL (0 mL intravenous Stopped 3/12/24 1041)   ketorolac (Toradol) injection 15 mg (15 mg intravenous Given 3/12/24 1035)   HYDROmorphone (Dilaudid) injection  - Omnicell Override Pull (  Given 3/12/24 1040)   iohexol (OMNIPaque) 350 mg iodine/mL solution 75 mL (75 mL intravenous Given 3/12/24 1110)       New Prescriptions from this visit:    New Prescriptions    No medications on file       Follow-up:  No follow-up provider specified.      Final Impression:   1. Ureteral stone          (Please note that portions of this note were completed with a voice recognition program.  Efforts were made to edit the dictations but occasionally words are mis-transcribed.)     Blayne Khan MD  Resident  03/12/24 141       Blayne Khan MD  Resident  03/12/24 1419

## 2024-03-12 NOTE — H&P (VIEW-ONLY)
History Of Present Illness  Gage Hernandes is a 33 y.o. male w/ pmhx of elevated creatinine, recurrent nephrolithiasis requiring lithotripsy in 2007 and stenting in 2008 presents to [facility] ED on 3/12 for sudden onset left sided back pain. Patient states the pain began as dull this morning when he woke up and then suddenly progressed to 12/10 sharp stabbing left sided back pain. It was not associated with nausea or vomiting and did not radiate into the groin.  He did not have any urinary symptoms like hematuria or pain with urination.  He did not notice any fransisco or grittiness to urine over the last couple days or today.  He does not believe that he has passed the stone.  He states he has had multiple kidney stones in the past starting in 2007 when he needed a lithotripsy and stenting in 2008.  He was being newly followed by Dr. Fuller (nephrologist) for an elevated creatinine found on routine blood work by PCP.  Creatinine was noted to be 1.34 in 2/21/2024 and no identifiable precipitating factors identified.  Renal ultrasound was done on 3/1/2024 that showed no hydronephrosis or stones.  Right kidney was measured at 11.3 cm and left kidney was measured at 1.8 cm.  Patient had recently moved from [place] and plans to move back in the next week or 2.  He denies having any fever or chills, no chest pain, no shortness of breath, no leg swelling, and no abdominal bruising.    In the ED patient was hemodynamically stable with a temperature of 98.1, 73 HR, 18 RR, 135/83 BP, 98% RA.  Labs significant for blood glucose of 113, sodium 134, creatinine 1.39, and UA showing moderate blood.  CT abdomen pelvis with contrast showed a 6 mm left stone in the left ureteropelvic junction with mild left hydronephrosis.  Also noting several renal stones that are nonobstructive.  Patient was treated with Toradol morphine and Zofran as well as 1 mg IV Dilaudid.  Given 1 L normal saline.  Patient noted to have nausea and vomiting  "after administration of morphine.  Urology was called from ED who instructed to admit patient for pain control and will scope and stent later today.     Past Medical History  He has no past medical history on file.    Surgical History  Lithotripsy and urothelial stenting     Social History  He reports that he has quit smoking. His smoking use included cigarettes. He uses smokeless tobacco. He reports that he does not currently use alcohol. He reports current drug use. Drug: Marijuana.    Family History  No family history on file.     Allergies  Cephalosporins    Review of Systems   ROS: 12 systems reviewed and negative except per HPI above     Physical Exam by System:    Constitutional: A&Ox4, NAD, resting comfortable   Head and Face: Atraumatic, normocephalic   Eyes: Normal external exam, EOMI  ENT: Normal external inspection of ears and nose. Oropharynx normal.  Cardiovascular: RRR, S1/S2, no murmurs, rubs, or gallops, radial pulses +2  Pulmonary: CTAB, no respiratory distress, no wheezing, rales or rhonchi, on RA  Abdomen: +BS, soft, non-tender, nondistended, no guarding rigidity or rebound tenderness, no masses noted  MSK: Mild CVA tenderness on L. Negative for edema, No joint swelling, normal movements of all extremities.   Neuro: No focal deficits, normal motor function, normal sensation, follows all commands  Skin- No lesions, contusions, or erythema.  Psychiatric: Judgment intact. Appropriate mood, affect and behavior      Last Recorded Vitals  Blood pressure 127/79, pulse 67, temperature 36.7 °C (98.1 °F), temperature source Temporal, resp. rate 18, height 1.778 m (5' 10\"), weight 77.1 kg (170 lb), SpO2 99 %.    Relevant Results  Results for orders placed or performed during the hospital encounter of 03/12/24 (from the past 24 hour(s))   CBC and Auto Differential   Result Value Ref Range    WBC 6.3 4.4 - 11.3 x10*3/uL    nRBC 0.0 0.0 - 0.0 /100 WBCs    RBC 4.59 4.50 - 5.90 x10*6/uL    Hemoglobin 14.0 13.5 - " 17.5 g/dL    Hematocrit 40.0 (L) 41.0 - 52.0 %    MCV 87 80 - 100 fL    MCH 30.5 26.0 - 34.0 pg    MCHC 35.0 32.0 - 36.0 g/dL    RDW 11.9 11.5 - 14.5 %    Platelets 280 150 - 450 x10*3/uL    Neutrophils % 50.3 40.0 - 80.0 %    Immature Granulocytes %, Automated 0.2 0.0 - 0.9 %    Lymphocytes % 37.5 13.0 - 44.0 %    Monocytes % 8.3 2.0 - 10.0 %    Eosinophils % 2.9 0.0 - 6.0 %    Basophils % 0.8 0.0 - 2.0 %    Neutrophils Absolute 3.17 1.20 - 7.70 x10*3/uL    Immature Granulocytes Absolute, Automated 0.01 0.00 - 0.70 x10*3/uL    Lymphocytes Absolute 2.36 1.20 - 4.80 x10*3/uL    Monocytes Absolute 0.52 0.10 - 1.00 x10*3/uL    Eosinophils Absolute 0.18 0.00 - 0.70 x10*3/uL    Basophils Absolute 0.05 0.00 - 0.10 x10*3/uL   Comprehensive metabolic panel   Result Value Ref Range    Glucose 113 (H) 74 - 99 mg/dL    Sodium 134 (L) 136 - 145 mmol/L    Potassium 3.8 3.5 - 5.3 mmol/L    Chloride 103 98 - 107 mmol/L    Bicarbonate 24 21 - 32 mmol/L    Anion Gap 11 10 - 20 mmol/L    Urea Nitrogen 16 6 - 23 mg/dL    Creatinine 1.39 (H) 0.50 - 1.30 mg/dL    eGFR 68 >60 mL/min/1.73m*2    Calcium 9.4 8.6 - 10.3 mg/dL    Albumin 4.7 3.4 - 5.0 g/dL    Alkaline Phosphatase 92 33 - 120 U/L    Total Protein 7.0 6.4 - 8.2 g/dL    AST 17 9 - 39 U/L    Bilirubin, Total 0.7 0.0 - 1.2 mg/dL    ALT 19 10 - 52 U/L   Lipase   Result Value Ref Range    Lipase 31 9 - 82 U/L   Urinalysis with Reflex Culture and Microscopic   Result Value Ref Range    Color, Urine Yellow Straw, Yellow    Appearance, Urine Clear Clear    Specific Gravity, Urine 1.031 1.005 - 1.035    pH, Urine 5.0 5.0, 5.5, 6.0, 6.5, 7.0, 7.5, 8.0    Protein, Urine NEGATIVE NEGATIVE mg/dL    Glucose, Urine NEGATIVE NEGATIVE mg/dL    Blood, Urine MODERATE (2+) (A) NEGATIVE    Ketones, Urine NEGATIVE NEGATIVE mg/dL    Bilirubin, Urine NEGATIVE NEGATIVE    Urobilinogen, Urine <2.0 <2.0 mg/dL    Nitrite, Urine NEGATIVE NEGATIVE    Leukocyte Esterase, Urine NEGATIVE NEGATIVE   Urinalysis  Microscopic   Result Value Ref Range    WBC, Urine 1-5 1-5, NONE /HPF    RBC, Urine 6-10 (A) NONE, 1-2, 3-5 /HPF      Scheduled medications     Continuous medications  sodium chloride 0.9%, 100 mL/hr      PRN medications  PRN medications: HYDROmorphone, oxyCODONE, oxyCODONE     CT abdomen pelvis w IV contrast    Result Date: 3/12/2024  Interpreted By:  Remedios Coreas, STUDY: CT ABDOMEN PELVIS W IV CONTRAST; ;  3/12/2024 11:13 am   INDICATION: Signs/Symptoms:severe left flank pain, hx kidney stones.   COMPARISON: None.   ACCESSION NUMBER(S): [id number]   ORDERING CLINICIAN: GALO LIMON   TECHNIQUE: Imaging was performed from dome of the diaphragm through ischial tuberosities with axial, coronal and sagittal images reconstructed. Patient received 75 mL Omnipaque 350 intravenously.   FINDINGS: Lung bases are clear. No pleural effusions are noted. The visualized heart appears normal in size.   No mass is noted in the liver. There is no intra or extrahepatic biliary dilatation. Gallbladder is normal in appearance.   No mass or inflammation is noted involving the pancreas.   Spleen is normal in size with no focal mass noted.   Adrenal glands appear normal.   No stones or hydronephrosis are noted in the right kidney. Several small stones are present in the left kidney, measuring up to 3 mm. There is mild hydronephrosis with a 6 mm stone at the left ureteropelvic junction. Ureter is not dilated and there are no additional stones noted more distally. 1.6 cm cyst is present upper pole left kidney..   Bowel loops are nondilated. Appendix appears normal. No ascites, pneumoperitoneum or mesenteric inflammation is identified.   Aorta and vena cava are normal in size. There are no pathologically enlarged retroperitoneal, iliac or inguinal lymph nodes identified.   Urinary bladder is normal in appearance with no wall thickening.   Prostate gland is nonenlarged.   Tiny amount of fat herniates into the base of the umbilicus.    Osseous structures show no acute abnormality.       6 mm stone left ureteropelvic junction with mild left hydronephrosis   Several left renal stones measuring 3 mm or less     MACRO: None.   Signed by: Remedios Coreas 3/12/2024 11:37 AM Dictation workstation:   [id number]        Assessment/Plan   Principal Problem:    Nephrolithiasis    Patient is a 33 y.o. male w/ pmhx of elevated creatinine, recurrent nephrolithiasis requiring lithotripsy in 2007 and stenting in 2008 presents with sudden onset severe back pain found to have a 6mm nephrolithiasis at the left ureteropelvic junction with mild hydronephrosis admitted for pain control and observation. Urology to do scope and stent today.    1.) L nephrolithiasis   2.) L mild hydronephrosis  3.) Elevated creatinine similar to prior, however will monitor for MAMADOU as patient has had both contrast and toradol  #Recurrent nephrolithiasis w/ lithotripsy (2007) and stenting (2008)  -Received contrast, toradol, morphine, dilaudid, and 1 x L NS in ED  -Urology consulted  -Plan for scope and possible stenting today  -Dilaudid IV prn for mod/severe  -Flomax 0.4mg  -Avoid nephrotoxic agents  -Spot protein/creatinine ratio  -1 L NS @100ml/hr for 10 hours    Home Medications:  Lamictal  Seroquel  Clonazepam    Diet: NPO, but can continue Regular after procedure   DVT: SCDs  Consults: Neurology  Code: Full    Disposition: Patient admitted for urological procedure and needing further pain management.  Anticipate a 1 night stay will discharge home.    Reviewed case with senior resident and attending physician,  Barry Savage D.O.  Internal Medicine, PGY-1  Thank you!        Patient seen and examined independently from intern physician.  The note as shown as above has been edited to reflect my input.    Erika Hicks MD  PGY-3

## 2024-03-12 NOTE — OP NOTE
Cystoscopy with STENT PLACEMENT (L) Operative Note     Date: 3/12/2024  OR Location: [facility] OR    Name: Gage Hernandes, : 1990, Age: 33 y.o., MRN: [id number], Sex: male    Diagnosis  Pre-op Diagnosis     * Calculus of kidney [N20.0] Post-op Diagnosis     * Calculus of kidney [N20.0]     Procedures  Cystoscopy with STENT PLACEMENT  60367 - WI CYSTO/URETERO W/LITHOTRIPSY &INDWELL STENT INSRT      Surgeons      * Elmer Lowery - Primary    Resident/Fellow/Other Assistant:  Surgeon(s) and Role:    Procedure Summary  Anesthesia: * No anesthesia type entered *  ASA: II  Anesthesia Staff: Anesthesiologist: Yohan Benson MD  CRNA: MOON Tobin-CRNA  Estimated Blood Loss: 0mL  Intra-op Medications:   Administrations occurring from 1527 to 1622 on 24:   Medication Name Total Dose   iodixanol (VISIPaque) 320 mg iodine/mL injection 50 mL   HYDROmorphone (Dilaudid) injection 0.4 mg Cannot be calculated   tamsulosin (Flomax) 24 hr capsule 0.4 mg Cannot be calculated              Anesthesia Record               Intraprocedure I/O Totals       None           Specimen: No specimens collected     Staff:   Circulator: Shruti Rivera RN  Scrub Person: Celsa Boyle         Drains and/or Catheters: * None in log *    Tourniquet Times:         Implants:  Implants       Type Name Action Serial No.      Stent STENT, URETERAL CONTOUR, 6FR X 28CM - [id number] Implanted               Findings: distal left ureter quite narrow not allowing passage of flex urs into left prox ureter or kidney    Indications: Gage Hernandes is an 33 y.o. male who is having surgery for Calculus of kidney [N20.0].     The patient was seen in the preoperative area. The risks, benefits, complications, treatment options, non-operative alternatives, expected recovery and outcomes were discussed with the patient. The possibilities of reaction to medication, pulmonary aspiration, injury to surrounding structures, bleeding, recurrent infection, the  need for additional procedures, failure to diagnose a condition, and creating a complication requiring transfusion or operation were discussed with the patient. The patient concurred with the proposed plan, giving informed consent.  The site of surgery was properly noted/marked if necessary per policy. The patient has been actively warmed in preoperative area. Preoperative antibiotics have been ordered and given within 1 hours of incision. Venous thrombosis prophylaxis are not indicated.      Pre-op diagnosis-proximal left ureter stone    Postop gnosis-same    Procedure performed-left flexible ureteroscopy and stone manipulation with catheter unable to complete procedure due to narrow distal left ureter and stent placement was done on the left    Anesthesia-General    Surgeon Dr. Payne contact    Indications-patient presented with an obstructing proximal left ureter stone and risk benefits alternatives discussed patient watchful waiting ureteroscopy versus ESWL and he wished to proceed with this procedure the risk included was not limited to bleeding infection need for stent stent removal stent discomfort need for two-stage procedure ureteral stricture perforation and damage need for additional procedure fix his problems and he wished to proceed findings correction    Findings-patient had a mildly narrow meatus but excepted the 21 Bhutanese cystoscope using the obturator and the remainder the urethra was normal.  The prostate was normal.  The bladder was normal.  The distal left ureter was found to be somewhat narrowed and I was able to place 1113 Bhutanese ureteral access sheath gently through this but the flexible ureteroscope would not pass up into the proximal left ureter where the stone had been located a 20 by sick stent was placed in the case    Summary of procedure-patient was prepped and draped in the usual sterile fashion given IV antibiotics.  Cystoscopy was done with findings as above.  Next a 5 Bhutanese Pollick  catheter was gently advanced to the left ureteral orifice and angled wire was taken of the left kidney with the stone being gently manipulated with the catheter to allow passage of wire.  Next the dual-lumen catheter was gently advanced and through the second port an Amplatz Super Stiff wire was advanced up to the left kidney.  Over the Amplatz Super Stiff wire 1113 Albanian 36 cm ureteral access sheath was gently advanced and went up relatively easily.  I then placed the disposable flexible ureteroscope through the sheath patient developed an erection.  I was unable to advance the flexible ureteroscope.  I then remove the equipment and drained his bladder.  His erection then came down to some degree.  We then attempted to advance a flexible ureteroscope without the sheath the distal left ureter was found to be somewhat narrowed but the flexible ureteroscope was navigable through this without much problem however once we got just above the iliac vessels secondary to resistance at most moat multiple levels unable to further advance the scope up into the proximal ureter.  Secondary to this a 28 x 6 stent was then placed over the safety wire with good positioning left kidney and bladder by fluoroscopy and direct visualization.  Bladder was then emptied and he was woken anesthesia good condition.    Disposition-will allow stent to passively dilate ureter and bring him back approximately 2 weeks for a follow-up left ureteroscopy stone package and hopeful stent removal    Elmer Lowery  Phone Number: [phone]

## 2024-03-12 NOTE — CONSULTS
Hpi as below    Ct shows 6mm prox left ureter stone that is +RO on ct   Admit for pain control  Creat stable but slightly high 1.3--1.4; no obvious infection      HISTORY OF PRESENT ILLNESS    HPI       PAST MEDICAL HISTORY   Medical History   History reviewed. No pertinent past medical history.           SURGICAL HISTORY     Surgical History   History reviewed. No pertinent surgical history.           CURRENT MEDICATIONS             Previous Medications     CLONAZEPAM (KLONOPIN) 0.5 MG TABLET    Take 1 tablet (0.5 mg) by mouth once daily.     LAMOTRIGINE (LAMICTAL) 200 MG TABLET    Take 2 tablets (400 mg) by mouth once daily.     QUETIAPINE XR (SEROQUEL XR) 300 MG 24 HR TABLET    Take 2 tablets (600 mg) by mouth once daily.         ALLERGIES     Cephalosporins     FAMILY HISTORY     Family History   No family history on file.           SOCIAL HISTORY        Social History   Social History            Socioeconomic History    Marital status: Single       Spouse name: None    Number of children: None    Years of education: None    Highest education level: None   Occupational History    None   Tobacco Use    Smoking status: Former       Types: Cigarettes    Smokeless tobacco: Current   Vaping Use    Vaping Use: Every day   Substance and Sexual Activity    Alcohol use: Not Currently    Drug use: Yes       Types: Marijuana       Comment: occassionally    Sexual activity: None   Other Topics Concern    None   Social History Narrative    None        Results for orders placed or performed during the hospital encounter of 03/12/24 (from the past 24 hour(s))   CBC and Auto Differential   Result Value Ref Range    WBC 6.3 4.4 - 11.3 x10*3/uL    nRBC 0.0 0.0 - 0.0 /100 WBCs    RBC 4.59 4.50 - 5.90 x10*6/uL    Hemoglobin 14.0 13.5 - 17.5 g/dL    Hematocrit 40.0 (L) 41.0 - 52.0 %    MCV 87 80 - 100 fL    MCH 30.5 26.0 - 34.0 pg    MCHC 35.0 32.0 - 36.0 g/dL    RDW 11.9 11.5 - 14.5 %    Platelets 280 150 - 450 x10*3/uL     Neutrophils % 50.3 40.0 - 80.0 %    Immature Granulocytes %, Automated 0.2 0.0 - 0.9 %    Lymphocytes % 37.5 13.0 - 44.0 %    Monocytes % 8.3 2.0 - 10.0 %    Eosinophils % 2.9 0.0 - 6.0 %    Basophils % 0.8 0.0 - 2.0 %    Neutrophils Absolute 3.17 1.20 - 7.70 x10*3/uL    Immature Granulocytes Absolute, Automated 0.01 0.00 - 0.70 x10*3/uL    Lymphocytes Absolute 2.36 1.20 - 4.80 x10*3/uL    Monocytes Absolute 0.52 0.10 - 1.00 x10*3/uL    Eosinophils Absolute 0.18 0.00 - 0.70 x10*3/uL    Basophils Absolute 0.05 0.00 - 0.10 x10*3/uL   Comprehensive metabolic panel   Result Value Ref Range    Glucose 113 (H) 74 - 99 mg/dL    Sodium 134 (L) 136 - 145 mmol/L    Potassium 3.8 3.5 - 5.3 mmol/L    Chloride 103 98 - 107 mmol/L    Bicarbonate 24 21 - 32 mmol/L    Anion Gap 11 10 - 20 mmol/L    Urea Nitrogen 16 6 - 23 mg/dL    Creatinine 1.39 (H) 0.50 - 1.30 mg/dL    eGFR 68 >60 mL/min/1.73m*2    Calcium 9.4 8.6 - 10.3 mg/dL    Albumin 4.7 3.4 - 5.0 g/dL    Alkaline Phosphatase 92 33 - 120 U/L    Total Protein 7.0 6.4 - 8.2 g/dL    AST 17 9 - 39 U/L    Bilirubin, Total 0.7 0.0 - 1.2 mg/dL    ALT 19 10 - 52 U/L   Lipase   Result Value Ref Range    Lipase 31 9 - 82 U/L   Urinalysis with Reflex Culture and Microscopic   Result Value Ref Range    Color, Urine Yellow Straw, Yellow    Appearance, Urine Clear Clear    Specific Gravity, Urine 1.031 1.005 - 1.035    pH, Urine 5.0 5.0, 5.5, 6.0, 6.5, 7.0, 7.5, 8.0    Protein, Urine NEGATIVE NEGATIVE mg/dL    Glucose, Urine NEGATIVE NEGATIVE mg/dL    Blood, Urine MODERATE (2+) (A) NEGATIVE    Ketones, Urine NEGATIVE NEGATIVE mg/dL    Bilirubin, Urine NEGATIVE NEGATIVE    Urobilinogen, Urine <2.0 <2.0 mg/dL    Nitrite, Urine NEGATIVE NEGATIVE    Leukocyte Esterase, Urine NEGATIVE NEGATIVE   Urinalysis Microscopic   Result Value Ref Range    WBC, Urine 1-5 1-5, NONE /HPF    RBC, Urine 6-10 (A) NONE, 1-2, 3-5 /HPF      CT abdomen pelvis w IV contrast    Result Date: 3/12/2024  Interpreted  By:  Remedios Coreas, STUDY: CT ABDOMEN PELVIS W IV CONTRAST; ;  3/12/2024 11:13 am   INDICATION: Signs/Symptoms:severe left flank pain, hx kidney stones.   COMPARISON: None.   ACCESSION NUMBER(S): [id number]   ORDERING CLINICIAN: GALO LIMON   TECHNIQUE: Imaging was performed from dome of the diaphragm through ischial tuberosities with axial, coronal and sagittal images reconstructed. Patient received 75 mL Omnipaque 350 intravenously.   FINDINGS: Lung bases are clear. No pleural effusions are noted. The visualized heart appears normal in size.   No mass is noted in the liver. There is no intra or extrahepatic biliary dilatation. Gallbladder is normal in appearance.   No mass or inflammation is noted involving the pancreas.   Spleen is normal in size with no focal mass noted.   Adrenal glands appear normal.   No stones or hydronephrosis are noted in the right kidney. Several small stones are present in the left kidney, measuring up to 3 mm. There is mild hydronephrosis with a 6 mm stone at the left ureteropelvic junction. Ureter is not dilated and there are no additional stones noted more distally. 1.6 cm cyst is present upper pole left kidney..   Bowel loops are nondilated. Appendix appears normal. No ascites, pneumoperitoneum or mesenteric inflammation is identified.   Aorta and vena cava are normal in size. There are no pathologically enlarged retroperitoneal, iliac or inguinal lymph nodes identified.   Urinary bladder is normal in appearance with no wall thickening.   Prostate gland is nonenlarged.   Tiny amount of fat herniates into the base of the umbilicus.   Osseous structures show no acute abnormality.       6 mm stone left ureteropelvic junction with mild left hydronephrosis   Several left renal stones measuring 3 mm or less     MACRO: None.   Signed by: Remedios Coreas 3/12/2024 11:37 AM Dictation workstation:   [id number]        A/p    Ct shows 6mm prox left ureter stone that is +RO on ct    Admit for pain control  Creat stable but slightly high 1.3--1.4; no obvious infection    Pt seen---wishes to proceed w/ left urs/stone package today; all r/b/a dw him at [facility] and alterniative of w.w. vs eswl d/w him as well    Hx eswl in remote past    Elmer Lowery MD

## 2024-03-12 NOTE — H&P
History Of Present Illness  Gage Hernandes is a 33 y.o. male w/ pmhx of elevated creatinine, recurrent nephrolithiasis requiring lithotripsy in 2007 and stenting in 2008 presents to [facility] ED on 3/12 for sudden onset left sided back pain. Patient states the pain began as dull this morning when he woke up and then suddenly progressed to 12/10 sharp stabbing left sided back pain. It was not associated with nausea or vomiting and did not radiate into the groin.  He did not have any urinary symptoms like hematuria or pain with urination.  He did not notice any fransisco or grittiness to urine over the last couple days or today.  He does not believe that he has passed the stone.  He states he has had multiple kidney stones in the past starting in 2007 when he needed a lithotripsy and stenting in 2008.  He was being newly followed by Dr. Fuller (nephrologist) for an elevated creatinine found on routine blood work by PCP.  Creatinine was noted to be 1.34 in 2/21/2024 and no identifiable precipitating factors identified.  Renal ultrasound was done on 3/1/2024 that showed no hydronephrosis or stones.  Right kidney was measured at 11.3 cm and left kidney was measured at 1.8 cm.  Patient had recently moved from [place] and plans to move back in the next week or 2.  He denies having any fever or chills, no chest pain, no shortness of breath, no leg swelling, and no abdominal bruising.    In the ED patient was hemodynamically stable with a temperature of 98.1, 73 HR, 18 RR, 135/83 BP, 98% RA.  Labs significant for blood glucose of 113, sodium 134, creatinine 1.39, and UA showing moderate blood.  CT abdomen pelvis with contrast showed a 6 mm left stone in the left ureteropelvic junction with mild left hydronephrosis.  Also noting several renal stones that are nonobstructive.  Patient was treated with Toradol morphine and Zofran as well as 1 mg IV Dilaudid.  Given 1 L normal saline.  Patient noted to have nausea and vomiting  "after administration of morphine.  Urology was called from ED who instructed to admit patient for pain control and will scope and stent later today.     Past Medical History  He has no past medical history on file.    Surgical History  Lithotripsy and urothelial stenting     Social History  He reports that he has quit smoking. His smoking use included cigarettes. He uses smokeless tobacco. He reports that he does not currently use alcohol. He reports current drug use. Drug: Marijuana.    Family History  No family history on file.     Allergies  Cephalosporins    Review of Systems   ROS: 12 systems reviewed and negative except per HPI above     Physical Exam by System:    Constitutional: A&Ox4, NAD, resting comfortable   Head and Face: Atraumatic, normocephalic   Eyes: Normal external exam, EOMI  ENT: Normal external inspection of ears and nose. Oropharynx normal.  Cardiovascular: RRR, S1/S2, no murmurs, rubs, or gallops, radial pulses +2  Pulmonary: CTAB, no respiratory distress, no wheezing, rales or rhonchi, on RA  Abdomen: +BS, soft, non-tender, nondistended, no guarding rigidity or rebound tenderness, no masses noted  MSK: Mild CVA tenderness on L. Negative for edema, No joint swelling, normal movements of all extremities.   Neuro: No focal deficits, normal motor function, normal sensation, follows all commands  Skin- No lesions, contusions, or erythema.  Psychiatric: Judgment intact. Appropriate mood, affect and behavior      Last Recorded Vitals  Blood pressure 127/79, pulse 67, temperature 36.7 °C (98.1 °F), temperature source Temporal, resp. rate 18, height 1.778 m (5' 10\"), weight 77.1 kg (170 lb), SpO2 99 %.    Relevant Results  Results for orders placed or performed during the hospital encounter of 03/12/24 (from the past 24 hour(s))   CBC and Auto Differential   Result Value Ref Range    WBC 6.3 4.4 - 11.3 x10*3/uL    nRBC 0.0 0.0 - 0.0 /100 WBCs    RBC 4.59 4.50 - 5.90 x10*6/uL    Hemoglobin 14.0 13.5 - " 17.5 g/dL    Hematocrit 40.0 (L) 41.0 - 52.0 %    MCV 87 80 - 100 fL    MCH 30.5 26.0 - 34.0 pg    MCHC 35.0 32.0 - 36.0 g/dL    RDW 11.9 11.5 - 14.5 %    Platelets 280 150 - 450 x10*3/uL    Neutrophils % 50.3 40.0 - 80.0 %    Immature Granulocytes %, Automated 0.2 0.0 - 0.9 %    Lymphocytes % 37.5 13.0 - 44.0 %    Monocytes % 8.3 2.0 - 10.0 %    Eosinophils % 2.9 0.0 - 6.0 %    Basophils % 0.8 0.0 - 2.0 %    Neutrophils Absolute 3.17 1.20 - 7.70 x10*3/uL    Immature Granulocytes Absolute, Automated 0.01 0.00 - 0.70 x10*3/uL    Lymphocytes Absolute 2.36 1.20 - 4.80 x10*3/uL    Monocytes Absolute 0.52 0.10 - 1.00 x10*3/uL    Eosinophils Absolute 0.18 0.00 - 0.70 x10*3/uL    Basophils Absolute 0.05 0.00 - 0.10 x10*3/uL   Comprehensive metabolic panel   Result Value Ref Range    Glucose 113 (H) 74 - 99 mg/dL    Sodium 134 (L) 136 - 145 mmol/L    Potassium 3.8 3.5 - 5.3 mmol/L    Chloride 103 98 - 107 mmol/L    Bicarbonate 24 21 - 32 mmol/L    Anion Gap 11 10 - 20 mmol/L    Urea Nitrogen 16 6 - 23 mg/dL    Creatinine 1.39 (H) 0.50 - 1.30 mg/dL    eGFR 68 >60 mL/min/1.73m*2    Calcium 9.4 8.6 - 10.3 mg/dL    Albumin 4.7 3.4 - 5.0 g/dL    Alkaline Phosphatase 92 33 - 120 U/L    Total Protein 7.0 6.4 - 8.2 g/dL    AST 17 9 - 39 U/L    Bilirubin, Total 0.7 0.0 - 1.2 mg/dL    ALT 19 10 - 52 U/L   Lipase   Result Value Ref Range    Lipase 31 9 - 82 U/L   Urinalysis with Reflex Culture and Microscopic   Result Value Ref Range    Color, Urine Yellow Straw, Yellow    Appearance, Urine Clear Clear    Specific Gravity, Urine 1.031 1.005 - 1.035    pH, Urine 5.0 5.0, 5.5, 6.0, 6.5, 7.0, 7.5, 8.0    Protein, Urine NEGATIVE NEGATIVE mg/dL    Glucose, Urine NEGATIVE NEGATIVE mg/dL    Blood, Urine MODERATE (2+) (A) NEGATIVE    Ketones, Urine NEGATIVE NEGATIVE mg/dL    Bilirubin, Urine NEGATIVE NEGATIVE    Urobilinogen, Urine <2.0 <2.0 mg/dL    Nitrite, Urine NEGATIVE NEGATIVE    Leukocyte Esterase, Urine NEGATIVE NEGATIVE   Urinalysis  Microscopic   Result Value Ref Range    WBC, Urine 1-5 1-5, NONE /HPF    RBC, Urine 6-10 (A) NONE, 1-2, 3-5 /HPF      Scheduled medications     Continuous medications  sodium chloride 0.9%, 100 mL/hr      PRN medications  PRN medications: HYDROmorphone, oxyCODONE, oxyCODONE     CT abdomen pelvis w IV contrast    Result Date: 3/12/2024  Interpreted By:  Remedios Coreas, STUDY: CT ABDOMEN PELVIS W IV CONTRAST; ;  3/12/2024 11:13 am   INDICATION: Signs/Symptoms:severe left flank pain, hx kidney stones.   COMPARISON: None.   ACCESSION NUMBER(S): [id number]   ORDERING CLINICIAN: GALO LIMON   TECHNIQUE: Imaging was performed from dome of the diaphragm through ischial tuberosities with axial, coronal and sagittal images reconstructed. Patient received 75 mL Omnipaque 350 intravenously.   FINDINGS: Lung bases are clear. No pleural effusions are noted. The visualized heart appears normal in size.   No mass is noted in the liver. There is no intra or extrahepatic biliary dilatation. Gallbladder is normal in appearance.   No mass or inflammation is noted involving the pancreas.   Spleen is normal in size with no focal mass noted.   Adrenal glands appear normal.   No stones or hydronephrosis are noted in the right kidney. Several small stones are present in the left kidney, measuring up to 3 mm. There is mild hydronephrosis with a 6 mm stone at the left ureteropelvic junction. Ureter is not dilated and there are no additional stones noted more distally. 1.6 cm cyst is present upper pole left kidney..   Bowel loops are nondilated. Appendix appears normal. No ascites, pneumoperitoneum or mesenteric inflammation is identified.   Aorta and vena cava are normal in size. There are no pathologically enlarged retroperitoneal, iliac or inguinal lymph nodes identified.   Urinary bladder is normal in appearance with no wall thickening.   Prostate gland is nonenlarged.   Tiny amount of fat herniates into the base of the umbilicus.    Osseous structures show no acute abnormality.       6 mm stone left ureteropelvic junction with mild left hydronephrosis   Several left renal stones measuring 3 mm or less     MACRO: None.   Signed by: Remedios Coreas 3/12/2024 11:37 AM Dictation workstation:   [id number]        Assessment/Plan   Principal Problem:    Nephrolithiasis    Patient is a 33 y.o. male w/ pmhx of elevated creatinine, recurrent nephrolithiasis requiring lithotripsy in 2007 and stenting in 2008 presents with sudden onset severe back pain found to have a 6mm nephrolithiasis at the left ureteropelvic junction with mild hydronephrosis admitted for pain control and observation. Urology to do scope and stent today.    1.) L nephrolithiasis   2.) L mild hydronephrosis  3.) Elevated creatinine similar to prior, however will monitor for MAMADOU as patient has had both contrast and toradol  #Recurrent nephrolithiasis w/ lithotripsy (2007) and stenting (2008)  -Received contrast, toradol, morphine, dilaudid, and 1 x L NS in ED  -Urology consulted  -Plan for scope and possible stenting today  -Dilaudid IV prn for mod/severe  -Flomax 0.4mg  -Avoid nephrotoxic agents  -Spot protein/creatinine ratio  -1 L NS @100ml/hr for 10 hours    Home Medications:  Lamictal  Seroquel  Clonazepam    Diet: NPO, but can continue Regular after procedure   DVT: SCDs  Consults: Neurology  Code: Full    Disposition: Patient admitted for urological procedure and needing further pain management.  Anticipate a 1 night stay will discharge home.    Reviewed case with senior resident and attending physician,  Barry Savage D.O.  Internal Medicine, PGY-1  Thank you!        Patient seen and examined independently from intern physician.  The note as shown as above has been edited to reflect my input.    Erika Hicks MD  PGY-3

## 2024-03-12 NOTE — ANESTHESIA PROCEDURE NOTES
Airway  Date/Time: 3/12/2024 3:51 PM  Urgency: elective    Airway not difficult    Staffing  Performed: CRNA   Authorized by: Yohan Benson MD    Performed by: MOON Tobin-MARIIA  Patient location during procedure: OR    Indications and Patient Condition  Indications for airway management: anesthesia  Spontaneous Ventilation: absent  Sedation level: deep  Preoxygenated: yes  MILS maintained throughout  Mask difficulty assessment: 1 - vent by mask    Final Airway Details  Final airway type: supraglottic airway      Successful airway: classic  Size 4     Number of attempts at approach: 1  Ventilation between attempts: BVM  Number of other approaches attempted: 0

## 2024-03-13 ENCOUNTER — TELEPHONE (OUTPATIENT)
Dept: PRIMARY CARE | Facility: CLINIC | Age: 34
End: 2024-03-13
Payer: COMMERCIAL

## 2024-03-13 NOTE — TELEPHONE ENCOUNTER
Patient called and wanted to let Dr. Singh know that he has kidney stone surgery yesterday. Patient also currently has a stent placed.     Patient states he had Ultrasound scheduled and did not get it due to having surgery.   - patient would like to know if he even still needs to have the ultrasound done.

## 2024-03-18 ENCOUNTER — PRE-ADMISSION TESTING (OUTPATIENT)
Dept: PREADMISSION TESTING | Facility: HOSPITAL | Age: 34
End: 2024-03-18
Payer: COMMERCIAL

## 2024-03-18 VITALS
SYSTOLIC BLOOD PRESSURE: 121 MMHG | RESPIRATION RATE: 16 BRPM | DIASTOLIC BLOOD PRESSURE: 86 MMHG | TEMPERATURE: 97.9 F | HEIGHT: 70 IN | BODY MASS INDEX: 25.28 KG/M2 | WEIGHT: 176.59 LBS | HEART RATE: 88 BPM | OXYGEN SATURATION: 96 %

## 2024-03-18 ASSESSMENT — PAIN - FUNCTIONAL ASSESSMENT: PAIN_FUNCTIONAL_ASSESSMENT: 0-10

## 2024-03-18 ASSESSMENT — PAIN SCALES - GENERAL: PAINLEVEL_OUTOF10: 0 - NO PAIN

## 2024-03-18 NOTE — PREPROCEDURE INSTRUCTIONS
Medication List            Accurate as of March 18, 2024  8:55 AM. Always use your most recent med list.                clonazePAM 0.5 mg tablet  Commonly known as: KlonoPIN  Medication Adjustments for Surgery: Take morning of surgery with sip of water, no other fluids     HYDROcodone-acetaminophen 5-325 mg tablet  Commonly known as: Norco  Take 1 tablet by mouth 2 times a day as needed for severe pain (7 - 10) for up to 7 days.  Medication Adjustments for Surgery: Other (Comment)  Notes to patient: As needed     lamoTRIgine 200 mg tablet  Commonly known as: LaMICtal  Medication Adjustments for Surgery: Take morning of surgery with sip of water, no other fluids     nitrofurantoin (macrocrystal-monohydrate) 100 mg capsule  Commonly known as: Macrobid  Take 1 capsule (100 mg) by mouth 2 times a day.  Medication Adjustments for Surgery: Take morning of surgery with sip of water, no other fluids     QUEtiapine  mg 24 hr tablet  Commonly known as: SEROquel XR  Medication Adjustments for Surgery: Take morning of surgery with sip of water, no other fluids                                  PRE-OPERATIVE INSTRUCTIONS    You will receive notification one business day prior to your procedure to confirm your arrival time. It is important that you answer your phone and/or check your messages during this time. If you do not hear from the surgery center by 5 pm. the day before your procedure, please call [phone].     Please enter the building through the Outpatient entrance and take the elevator off the lobby to the 2nd floor then check in at the Outpatient Surgery desk on the 2nd floor.    INSTRUCTIONS:  Talk to your surgeon for instructions if you should stop your aspirin, blood thinner, or diabetes medicines.  DO NOT take any multivitamins or over the counter supplements for 7-10 days before surgery.  If not being admitted, you must have an adult immediately available to drive you home after surgery. We also highly  recommend you have someone stay with you for the entire day and night of your surgery.  For children having surgery, a parent or legal guardian must accompany them to the surgery center. If this is not possible, please call [phone] to make additional arrangements.  For adults who are unable to consent or make medical decisions for themselves, a legal guardian or Power of  must accompany them to the surgery center. If this is not possible, please call [phone] to make additional arrangements.  Wear comfortable, loose fitting clothing.  All jewelry and piercings must be removed. If you are unable to remove an item or have a dermal piercing, please be sure to tell the nurse when you arrive for surgery.  Nail polish and make-up must be removed.  Avoid smoking or consuming alcohol for 24 hours before surgery.  To help prevent infection, please take a shower/bath and wash your hair the night before and/or morning of surgery (or follow other specific bathing instructions provided).    Preoperative Fasting Guidelines    Why must I stop eating and drinking near surgery time?  With sedation, food or liquid in your stomach can enter your lungs causing serious complications  Increases nausea and vomiting    When do I need to stop eating and drinking before my surgery?  Do not eat any solid food after midnight the night before your surgery/procedure.  You may have up to TEN ounces of clear liquid until TWO hours before your instructed arrival time to the hospital.  This includes water, black tea/coffee, (no milk or cream) apple juice, and electrolyte drinks (Gatorade).   You may chew gum until TWO hours before your surgery/procedure    If you have any questions or concerns, please call Pre-Admission Testing at [phone].     Home Preoperative Antibacterial Shower with Chlorhexidine gluconate (CHG)     What is a home preoperative antibacterial shower?  This shower is a way of cleaning the skin with a germ  killing solution before surgery. The solution contains chlorhexidine gluconate, commonly known as CHG. CHG is a skin cleanser with germ killing ability. Let your doctor know if you are allergic to chlorhexidine.    Why do I need to take a preoperative antibacterial shower?  Skin is not sterile. It is best to try to make your skin as free of germs as possible before surgery. Proper cleansing with a germ killing soap before surgery can lower the number of germs on your skin. This helps to reduce the risk of infection at the surgical site. Following the instructions listed below will help you prepare your skin for surgery.    How do I use the solution?  Begin using your CHG soap the night before and again the morning of your procedure.   Do not shave the day before or day of surgery.  Remove all jewelry until after surgery. Take off rings and take out all body-piercing jewelry.  Wash your face and hair with normal soap and shampoo before you use the CHG soap.  Apply the CHG solution to a clean wet washcloth. Move away from the water to avoid premature rinsing of the CHG soap as you are applying. Firmly lather your entire body from the neck down. Do not use CHG on your face, eyes, ears, or genitals.   Pay special attention to the area where your incisions will be located.  Do not scrub your skin too hard.  It is important to allow the CHG soap to sit on your skin for 3-5 minutes.  Rinse the solution off your body completely. Do not wash with your normal soap after the CHG soap solution.  Pat yourself dry with a clean, soft towel.  Do not apply powders, lotions or deodorants as these might block how the CHG soap works.   Dress in clean clothing.  Be sure to sleep with clean, freshly laundered sheets.  Be aware that CHG can cause stains on fabric. Rinse your washcloth and other linens that have contact with CHG completely. Use only non-chlorine detergents to launder the items used.

## 2024-03-21 ENCOUNTER — APPOINTMENT (OUTPATIENT)
Dept: UROLOGY | Facility: CLINIC | Age: 34
End: 2024-03-21
Payer: COMMERCIAL

## 2024-03-25 ENCOUNTER — HOSPITAL ENCOUNTER (OUTPATIENT)
Dept: RADIOLOGY | Facility: CLINIC | Age: 34
Discharge: HOME | End: 2024-03-25
Payer: COMMERCIAL

## 2024-03-25 DIAGNOSIS — R10.84 GENERALIZED ABDOMINAL PAIN: ICD-10-CM

## 2024-03-25 PROCEDURE — 76700 US EXAM ABDOM COMPLETE: CPT

## 2024-03-25 PROCEDURE — 76700 US EXAM ABDOM COMPLETE: CPT | Performed by: RADIOLOGY

## 2024-03-26 ENCOUNTER — ANESTHESIA (OUTPATIENT)
Dept: OPERATING ROOM | Facility: HOSPITAL | Age: 34
End: 2024-03-26
Payer: COMMERCIAL

## 2024-03-26 ENCOUNTER — APPOINTMENT (OUTPATIENT)
Dept: RADIOLOGY | Facility: HOSPITAL | Age: 34
End: 2024-03-26
Payer: COMMERCIAL

## 2024-03-26 ENCOUNTER — ANESTHESIA EVENT (OUTPATIENT)
Dept: OPERATING ROOM | Facility: HOSPITAL | Age: 34
End: 2024-03-26
Payer: COMMERCIAL

## 2024-03-26 ENCOUNTER — HOSPITAL ENCOUNTER (OUTPATIENT)
Facility: HOSPITAL | Age: 34
Setting detail: OUTPATIENT SURGERY
Discharge: HOME | End: 2024-03-26
Attending: UROLOGY | Admitting: UROLOGY
Payer: COMMERCIAL

## 2024-03-26 VITALS
OXYGEN SATURATION: 99 % | RESPIRATION RATE: 17 BRPM | SYSTOLIC BLOOD PRESSURE: 108 MMHG | HEART RATE: 65 BPM | HEIGHT: 70 IN | DIASTOLIC BLOOD PRESSURE: 70 MMHG | TEMPERATURE: 97.7 F | BODY MASS INDEX: 25.28 KG/M2 | WEIGHT: 176.59 LBS

## 2024-03-26 DIAGNOSIS — N13.2 HYDRONEPHROSIS WITH URETERAL CALCULUS: ICD-10-CM

## 2024-03-26 DIAGNOSIS — N20.0 NEPHROLITHIASIS: Primary | ICD-10-CM

## 2024-03-26 PROCEDURE — 3700000001 HC GENERAL ANESTHESIA TIME - INITIAL BASE CHARGE: Performed by: UROLOGY

## 2024-03-26 PROCEDURE — 3700000002 HC GENERAL ANESTHESIA TIME - EACH INCREMENTAL 1 MINUTE: Performed by: UROLOGY

## 2024-03-26 PROCEDURE — C1894 INTRO/SHEATH, NON-LASER: HCPCS | Performed by: UROLOGY

## 2024-03-26 PROCEDURE — 7100000009 HC PHASE TWO TIME - INITIAL BASE CHARGE: Performed by: UROLOGY

## 2024-03-26 PROCEDURE — A4217 STERILE WATER/SALINE, 500 ML: HCPCS | Performed by: UROLOGY

## 2024-03-26 PROCEDURE — 82365 CALCULUS SPECTROSCOPY: CPT | Performed by: UROLOGY

## 2024-03-26 PROCEDURE — 3600000008 HC OR TIME - EACH INCREMENTAL 1 MINUTE - PROCEDURE LEVEL THREE: Performed by: UROLOGY

## 2024-03-26 PROCEDURE — 2720000007 HC OR 272 NO HCPCS: Performed by: UROLOGY

## 2024-03-26 PROCEDURE — 2500000004 HC RX 250 GENERAL PHARMACY W/ HCPCS (ALT 636 FOR OP/ED): Performed by: UROLOGY

## 2024-03-26 PROCEDURE — 7100000001 HC RECOVERY ROOM TIME - INITIAL BASE CHARGE: Performed by: UROLOGY

## 2024-03-26 PROCEDURE — 7100000010 HC PHASE TWO TIME - EACH INCREMENTAL 1 MINUTE: Performed by: UROLOGY

## 2024-03-26 PROCEDURE — C1769 GUIDE WIRE: HCPCS | Performed by: UROLOGY

## 2024-03-26 PROCEDURE — 2500000004 HC RX 250 GENERAL PHARMACY W/ HCPCS (ALT 636 FOR OP/ED): Performed by: STUDENT IN AN ORGANIZED HEALTH CARE EDUCATION/TRAINING PROGRAM

## 2024-03-26 PROCEDURE — 2550000001 HC RX 255 CONTRASTS: Performed by: UROLOGY

## 2024-03-26 PROCEDURE — 2500000004 HC RX 250 GENERAL PHARMACY W/ HCPCS (ALT 636 FOR OP/ED): Performed by: ANESTHESIOLOGIST ASSISTANT

## 2024-03-26 PROCEDURE — 3600000003 HC OR TIME - INITIAL BASE CHARGE - PROCEDURE LEVEL THREE: Performed by: UROLOGY

## 2024-03-26 PROCEDURE — 7100000002 HC RECOVERY ROOM TIME - EACH INCREMENTAL 1 MINUTE: Performed by: UROLOGY

## 2024-03-26 PROCEDURE — A52356 PR CYSTO/URETERO W/LITHOTRIPSY  AND INDWELL STENT INSRT: Performed by: ANESTHESIOLOGIST ASSISTANT

## 2024-03-26 PROCEDURE — 2500000001 HC RX 250 WO HCPCS SELF ADMINISTERED DRUGS (ALT 637 FOR MEDICARE OP): Performed by: STUDENT IN AN ORGANIZED HEALTH CARE EDUCATION/TRAINING PROGRAM

## 2024-03-26 PROCEDURE — A52356 PR CYSTO/URETERO W/LITHOTRIPSY  AND INDWELL STENT INSRT: Performed by: STUDENT IN AN ORGANIZED HEALTH CARE EDUCATION/TRAINING PROGRAM

## 2024-03-26 PROCEDURE — 2500000005 HC RX 250 GENERAL PHARMACY W/O HCPCS: Performed by: ANESTHESIOLOGIST ASSISTANT

## 2024-03-26 RX ORDER — ONDANSETRON HYDROCHLORIDE 2 MG/ML
INJECTION, SOLUTION INTRAVENOUS AS NEEDED
Status: DISCONTINUED | OUTPATIENT
Start: 2024-03-26 | End: 2024-03-26

## 2024-03-26 RX ORDER — LIDOCAINE HYDROCHLORIDE 20 MG/ML
INJECTION, SOLUTION EPIDURAL; INFILTRATION; INTRACAUDAL; PERINEURAL AS NEEDED
Status: DISCONTINUED | OUTPATIENT
Start: 2024-03-26 | End: 2024-03-26

## 2024-03-26 RX ORDER — PROPOFOL 10 MG/ML
INJECTION, EMULSION INTRAVENOUS AS NEEDED
Status: DISCONTINUED | OUTPATIENT
Start: 2024-03-26 | End: 2024-03-26

## 2024-03-26 RX ORDER — OXYCODONE HYDROCHLORIDE 5 MG/1
5 TABLET ORAL EVERY 4 HOURS PRN
Status: DISCONTINUED | OUTPATIENT
Start: 2024-03-26 | End: 2024-03-26 | Stop reason: HOSPADM

## 2024-03-26 RX ORDER — HYDROCODONE BITARTRATE AND ACETAMINOPHEN 5; 325 MG/1; MG/1
1 TABLET ORAL 3 TIMES DAILY PRN
Qty: 10 TABLET | Refills: 0 | Status: SHIPPED | OUTPATIENT
Start: 2024-03-26 | End: 2024-03-29

## 2024-03-26 RX ORDER — FENTANYL CITRATE 50 UG/ML
INJECTION, SOLUTION INTRAMUSCULAR; INTRAVENOUS AS NEEDED
Status: DISCONTINUED | OUTPATIENT
Start: 2024-03-26 | End: 2024-03-26

## 2024-03-26 RX ORDER — ONDANSETRON HYDROCHLORIDE 2 MG/ML
4 INJECTION, SOLUTION INTRAVENOUS ONCE AS NEEDED
Status: COMPLETED | OUTPATIENT
Start: 2024-03-26 | End: 2024-03-26

## 2024-03-26 RX ORDER — FENTANYL CITRATE 50 UG/ML
25 INJECTION, SOLUTION INTRAMUSCULAR; INTRAVENOUS EVERY 5 MIN PRN
Status: DISCONTINUED | OUTPATIENT
Start: 2024-03-26 | End: 2024-03-26 | Stop reason: HOSPADM

## 2024-03-26 RX ORDER — LIDOCAINE HYDROCHLORIDE 10 MG/ML
0.1 INJECTION, SOLUTION EPIDURAL; INFILTRATION; INTRACAUDAL; PERINEURAL ONCE
Status: DISCONTINUED | OUTPATIENT
Start: 2024-03-26 | End: 2024-03-26 | Stop reason: HOSPADM

## 2024-03-26 RX ORDER — MEPERIDINE HYDROCHLORIDE 50 MG/ML
12.5 INJECTION INTRAMUSCULAR; INTRAVENOUS; SUBCUTANEOUS EVERY 10 MIN PRN
Status: DISCONTINUED | OUTPATIENT
Start: 2024-03-26 | End: 2024-03-26 | Stop reason: HOSPADM

## 2024-03-26 RX ORDER — SODIUM CHLORIDE, SODIUM LACTATE, POTASSIUM CHLORIDE, CALCIUM CHLORIDE 600; 310; 30; 20 MG/100ML; MG/100ML; MG/100ML; MG/100ML
100 INJECTION, SOLUTION INTRAVENOUS CONTINUOUS
Status: DISCONTINUED | OUTPATIENT
Start: 2024-03-26 | End: 2024-03-26 | Stop reason: HOSPADM

## 2024-03-26 RX ORDER — CIPROFLOXACIN 2 MG/ML
400 INJECTION, SOLUTION INTRAVENOUS ONCE
Status: COMPLETED | OUTPATIENT
Start: 2024-03-26 | End: 2024-03-26

## 2024-03-26 RX ORDER — ALBUTEROL SULFATE 0.83 MG/ML
2.5 SOLUTION RESPIRATORY (INHALATION) ONCE AS NEEDED
Status: DISCONTINUED | OUTPATIENT
Start: 2024-03-26 | End: 2024-03-26 | Stop reason: HOSPADM

## 2024-03-26 RX ORDER — CIPROFLOXACIN 500 MG/1
500 TABLET ORAL 2 TIMES DAILY
Qty: 6 TABLET | Refills: 0 | Status: SHIPPED | OUTPATIENT
Start: 2024-03-26 | End: 2024-03-29

## 2024-03-26 RX ORDER — DEXAMETHASONE SODIUM PHOSPHATE 10 MG/ML
INJECTION INTRAMUSCULAR; INTRAVENOUS AS NEEDED
Status: DISCONTINUED | OUTPATIENT
Start: 2024-03-26 | End: 2024-03-26

## 2024-03-26 RX ORDER — IODIXANOL 320 MG/ML
INJECTION, SOLUTION INTRAVASCULAR AS NEEDED
Status: DISCONTINUED | OUTPATIENT
Start: 2024-03-26 | End: 2024-03-26 | Stop reason: HOSPADM

## 2024-03-26 RX ORDER — SODIUM CHLORIDE 0.9 G/100ML
IRRIGANT IRRIGATION AS NEEDED
Status: DISCONTINUED | OUTPATIENT
Start: 2024-03-26 | End: 2024-03-26 | Stop reason: HOSPADM

## 2024-03-26 RX ORDER — MIDAZOLAM HYDROCHLORIDE 1 MG/ML
INJECTION, SOLUTION INTRAMUSCULAR; INTRAVENOUS AS NEEDED
Status: DISCONTINUED | OUTPATIENT
Start: 2024-03-26 | End: 2024-03-26

## 2024-03-26 RX ORDER — LABETALOL HYDROCHLORIDE 5 MG/ML
5 INJECTION, SOLUTION INTRAVENOUS ONCE AS NEEDED
Status: DISCONTINUED | OUTPATIENT
Start: 2024-03-26 | End: 2024-03-26 | Stop reason: HOSPADM

## 2024-03-26 RX ORDER — SODIUM CHLORIDE, SODIUM LACTATE, POTASSIUM CHLORIDE, CALCIUM CHLORIDE 600; 310; 30; 20 MG/100ML; MG/100ML; MG/100ML; MG/100ML
INJECTION, SOLUTION INTRAVENOUS CONTINUOUS PRN
Status: DISCONTINUED | OUTPATIENT
Start: 2024-03-26 | End: 2024-03-26

## 2024-03-26 RX ADMIN — OXYCODONE HYDROCHLORIDE 5 MG: 5 TABLET ORAL at 14:31

## 2024-03-26 RX ADMIN — HYDROMORPHONE HYDROCHLORIDE 0.5 MG: 1 INJECTION, SOLUTION INTRAMUSCULAR; INTRAVENOUS; SUBCUTANEOUS at 14:12

## 2024-03-26 RX ADMIN — MIDAZOLAM 2 MG: 1 INJECTION INTRAMUSCULAR; INTRAVENOUS at 12:50

## 2024-03-26 RX ADMIN — ONDANSETRON 4 MG: 2 INJECTION, SOLUTION INTRAMUSCULAR; INTRAVENOUS at 13:19

## 2024-03-26 RX ADMIN — CIPROFLOXACIN 400 MG: 400 INJECTION, SOLUTION INTRAVENOUS at 12:54

## 2024-03-26 RX ADMIN — PROPOFOL 200 MG: 10 INJECTION, EMULSION INTRAVENOUS at 12:50

## 2024-03-26 RX ADMIN — ONDANSETRON 4 MG: 2 INJECTION INTRAMUSCULAR; INTRAVENOUS at 13:49

## 2024-03-26 RX ADMIN — DEXAMETHASONE SODIUM PHOSPHATE 10 MG: 10 INJECTION INTRAMUSCULAR; INTRAVENOUS at 12:50

## 2024-03-26 RX ADMIN — FENTANYL CITRATE 50 MCG: 50 INJECTION, SOLUTION INTRAMUSCULAR; INTRAVENOUS at 12:50

## 2024-03-26 RX ADMIN — LIDOCAINE HYDROCHLORIDE 100 MG: 20 INJECTION, SOLUTION EPIDURAL; INFILTRATION; INTRACAUDAL; PERINEURAL at 12:50

## 2024-03-26 RX ADMIN — HYDROMORPHONE HYDROCHLORIDE 0.5 MG: 1 INJECTION, SOLUTION INTRAMUSCULAR; INTRAVENOUS; SUBCUTANEOUS at 13:48

## 2024-03-26 RX ADMIN — FENTANYL CITRATE 50 MCG: 50 INJECTION, SOLUTION INTRAMUSCULAR; INTRAVENOUS at 13:09

## 2024-03-26 RX ADMIN — HYDROMORPHONE HYDROCHLORIDE 0.5 MG: 1 INJECTION, SOLUTION INTRAMUSCULAR; INTRAVENOUS; SUBCUTANEOUS at 14:23

## 2024-03-26 RX ADMIN — SODIUM CHLORIDE, POTASSIUM CHLORIDE, SODIUM LACTATE AND CALCIUM CHLORIDE: 600; 310; 30; 20 INJECTION, SOLUTION INTRAVENOUS at 12:46

## 2024-03-26 SDOH — HEALTH STABILITY: MENTAL HEALTH: CURRENT SMOKER: 0

## 2024-03-26 ASSESSMENT — PAIN SCALES - GENERAL
PAINLEVEL_OUTOF10: 2
PAINLEVEL_OUTOF10: 4
PAINLEVEL_OUTOF10: 3
PAINLEVEL_OUTOF10: 7
PAINLEVEL_OUTOF10: 3
PAINLEVEL_OUTOF10: 2
PAINLEVEL_OUTOF10: 7
PAINLEVEL_OUTOF10: 7
PAINLEVEL_OUTOF10: 3
PAINLEVEL_OUTOF10: 3
PAINLEVEL_OUTOF10: 0 - NO PAIN

## 2024-03-26 ASSESSMENT — COLUMBIA-SUICIDE SEVERITY RATING SCALE - C-SSRS
1. IN THE PAST MONTH, HAVE YOU WISHED YOU WERE DEAD OR WISHED YOU COULD GO TO SLEEP AND NOT WAKE UP?: NO
6. HAVE YOU EVER DONE ANYTHING, STARTED TO DO ANYTHING, OR PREPARED TO DO ANYTHING TO END YOUR LIFE?: NO
2. HAVE YOU ACTUALLY HAD ANY THOUGHTS OF KILLING YOURSELF?: NO

## 2024-03-26 ASSESSMENT — PAIN DESCRIPTION - DESCRIPTORS
DESCRIPTORS: BURNING;ACHING
DESCRIPTORS: ACHING;BURNING
DESCRIPTORS: ACHING;BURNING
DESCRIPTORS: BURNING;ACHING

## 2024-03-26 ASSESSMENT — PAIN DESCRIPTION - ORIENTATION
ORIENTATION: LEFT

## 2024-03-26 ASSESSMENT — PAIN DESCRIPTION - LOCATION
LOCATION: BACK
LOCATION: PENIS
LOCATION: BACK
LOCATION: BACK

## 2024-03-26 NOTE — ANESTHESIA PROCEDURE NOTES
Airway  Date/Time: 3/26/2024 12:51 PM  Urgency: elective    Airway not difficult    Staffing  Performed: VIDA   Authorized by: Issac Lara DO    Performed by: VIDA Solo  Patient location during procedure: OR    Indications and Patient Condition  Indications for airway management: anesthesia  Spontaneous Ventilation: absent  Sedation level: deep  Preoxygenated: yes  Patient position: sniffing  MILS maintained throughout  Mask difficulty assessment: 1 - vent by mask    Final Airway Details  Final airway type: supraglottic airway      Successful airway: classic  Size 5  Airway Seal Pressure (cm H2O): 10     Number of attempts at approach: 1  Number of other approaches attempted: 0

## 2024-03-26 NOTE — OP NOTE
left ureteroscopy flex vs ridgid with laser and cysto with jj stent (L) Operative Note     Date: 3/26/2024  OR Location: [facility] OR    Name: Gage Hernandes YOB: 1990, Age: 33 y.o., MRN: [id number], Sex: male    Diagnosis  Pre-op Diagnosis     * Hydronephrosis with ureteral calculus [N13.2] Post-op Diagnosis     * Hydronephrosis with ureteral calculus [N13.2]     Procedures  left ureteroscopy flex vs ridgid with laser and cysto with jj stent  76207 - TN CYSTO/URETERO W/LITHOTRIPSY &INDWELL STENT INSRT      Surgeons      * Elmer Lowery - Primary    Resident/Fellow/Other Assistant:  Surgeon(s) and Role:    Procedure Summary  Anesthesia: General  ASA: II  Anesthesia Staff: Anesthesiologist: DO BETSY Saavedra-AA: VIDA Solo  Estimated Blood Loss: mL  Intra-op Medications:   Administrations occurring from 1230 to 1355 on 24:   Medication Name Total Dose   sodium chloride 0.9 % irrigation solution 3,000 mL   iodixanol (VISIPaque) 320 mg iodine/mL injection 50 mL   ciprofloxacin (Cipro) IVPB 400 mg 400 mg              Anesthesia Record               Intraprocedure I/O Totals       None           Specimen:   ID Type Source Tests Collected by Time   A : LEFT RENAL CALCULUS Calculus Urine, Clean Catch CALCULI (STONE) ANALYSIS Elmer Lowery MD 3/26/2024 1320        Staff:   Circulator: Shruti Rivera RN  Scrub Person: Mary Seymour         Drains and/or Catheters: * None in log *    Tourniquet Times:         Implants:     Findings:     Indications: Gage Hernandes is an 33 y.o. male who is having surgery for Hydronephrosis with ureteral calculus [N13.2].     The patient was seen in the preoperative area. The risks, benefits, complications, treatment options, non-operative alternatives, expected recovery and outcomes were discussed with the patient. The possibilities of reaction to medication, pulmonary aspiration, injury to surrounding structures, bleeding, recurrent infection, the need for  additional procedures, failure to diagnose a condition, and creating a complication requiring transfusion or operation were discussed with the patient. The patient concurred with the proposed plan, giving informed consent.  The site of surgery was properly noted/marked if necessary per policy. The patient has been actively warmed in preoperative area. Preoperative antibiotics have been ordered and given within 1 hours of incision. Venous thrombosis prophylaxis have been ordered including bilateral sequential compression devices    Procedure Details:     Pre-op diagnosis-left ureter and kidney stone    Postop gnosis-left kidney stone    Procedure form-left flexible ureteroscopy, holmium laser lithotripsy of stone, basket extraction of stone, retrograde pyelogram, stent removal    Surgeon-Dr. Contact    Anesthesia-General    Indications-this is a gentleman that presented with an obstructing ureter stone status post stenting brought back today for completion of surgery risk benefits and alternatives were all discussed patient wished to proceed.    Findings-patient stone and blown back up from the ureter into the kidney and the lower pole.  Was extensively dusted and the largest piece that remained was approximately 2 mm which was removed.  Retrograde pyelogram did not show any obvious hydronephrosis.  The urethra was slightly narrowed at the fossa and sequentially dilated from 18-24 [place].  The bladder was otherwise normal.    Summary of procedure-patient was met in preop area marked appropriately given antibiotics.  Patient then had his urethra was sequentially dilated as above.  Patient had a 21 [place] cystoscope then placed and stent was grasped brought to the meatus which angled wire was taken for left kidney.  Over this an 810 dilator was placed and retrograde pyelogram was done through this to delineate the anatomy.  Next a second wire was placed and with 2 wires in place the flexible ureteroscope was gently  advanced up into the kidney.  All calyces were inspected and stone was found in the lower pole.  The 200 µm fiber was then used at a setting of 50 and 0.2 the stone was lasered and dusted.  The ZeroTip basket was then used to atraumatically extract pieces.  The ureter was intact and there was no stone in the ureter.  The safety wire was then removed and cystoscopy was repeated and good E flux of urine was noted of the left ureter therefore no stent was placed.  Patient's bladder was emptied he was woken anesthesia good condition.    Disposition-patient will see me back in my office in 4 weeks to go through stone analysis.  He would be due for a KUB x-ray in approximately 6 months      Elmer Lowery  Phone Number: [phone]

## 2024-03-26 NOTE — ANESTHESIA PREPROCEDURE EVALUATION
Patient: Gage Hernandes    Procedure Information       Date/Time: 03/26/24 1230    Procedure: left ureteroscopy flex vs ridgid with laser and cysto with jj stent (Left)    Location: [facility] OR 08 / [facility]    Surgeons: Elmer Lowery MD        There were no vitals filed for this visit.    Past Surgical History:   Procedure Laterality Date    KIDNEY STONE SURGERY       Past Medical History:   Diagnosis Date    Anxiety     Nephrolithiasis     Urinary problem in male      No current facility-administered medications for this encounter.  Prior to Admission medications    Medication Sig Start Date End Date Taking? Authorizing Provider   clonazePAM (KlonoPIN) 0.5 mg tablet Take 1 tablet (0.5 mg) by mouth once daily. 2/17/24   Historical Provider, MD   HYDROcodone-acetaminophen (Norco) 5-325 mg tablet Take 1 tablet by mouth 2 times a day as needed for severe pain (7 - 10) for up to 7 days. 3/12/24 3/19/24  Elmer Lowery MD   lamoTRIgine (LaMICtal) 200 mg tablet Take 2 tablets (400 mg) by mouth once daily. 1/26/24   Historical Provider, MD   nitrofurantoin, macrocrystal-monohydrate, (Macrobid) 100 mg capsule Take 1 capsule (100 mg) by mouth 2 times a day. 3/12/24   Elmer Lowery MD   QUEtiapine XR (SEROquel XR) 300 mg 24 hr tablet Take 2 tablets (600 mg) by mouth once daily. 1/26/24   Historical Provider, MD     Allergies   Allergen Reactions    Cephalosporins Rash     Ceclor - rash     Social History     Tobacco Use    Smoking status: Former     Types: Cigarettes    Smokeless tobacco: Current   Substance Use Topics    Alcohol use: Not Currently         Chemistry    Lab Results   Component Value Date/Time     (L) 03/12/2024 1007    K 3.8 03/12/2024 1007     03/12/2024 1007    CO2 24 03/12/2024 1007    BUN 16 03/12/2024 1007    CREATININE 1.39 (H) 03/12/2024 1007    Lab Results   Component Value Date/Time    CALCIUM 9.4 03/12/2024 1007    ALKPHOS 92 03/12/2024 1007    AST 17 03/12/2024 1007    ALT 19  "03/12/2024 1007    BILITOT 0.7 03/12/2024 1007          Lab Results   Component Value Date/Time    WBC 6.3 03/12/2024 1007    HGB 14.0 03/12/2024 1007    HCT 40.0 (L) 03/12/2024 1007     03/12/2024 [facility]     No results found for: \"PROTIME\", \"PTT\", \"INR\"  No results found for this or any previous visit (from the past 4464 hour(s)).     Relevant Problems   /Renal   (+) Nephrolithiasis       Clinical information reviewed:                   NPO Detail:  No data recorded     Physical Exam    Airway  Mallampati: II  TM distance: >3 FB  Neck ROM: full     Cardiovascular - normal exam  Rhythm: regular  Rate: normal     Dental - normal exam     Pulmonary - normal exam     Abdominal - normal exam  Abdomen: soft             Anesthesia Plan    History of general anesthesia?: yes  History of complications of general anesthesia?: no    ASA 2     general     The patient is not a current smoker.  Patient was previously instructed to abstain from smoking on day of procedure.  Patient did not smoke on day of procedure.  Education provided regarding risk of obstructive sleep apnea.  intravenous induction   Postoperative administration of opioids is intended.  Anesthetic plan and risks discussed with patient.  Use of blood products discussed with patient who.    Plan discussed with CAA.      "

## 2024-03-26 NOTE — ANESTHESIA POSTPROCEDURE EVALUATION
Patient: Gage Hernandes    Procedure Summary       Date: 03/26/24 Room / Location: [facility] OR 08 / [facility]    Anesthesia Start: 1239 Anesthesia Stop: 1339    Procedure: left ureteroscopy flex vs ridgid with laser and cysto with jj stent (Left) Diagnosis:       Hydronephrosis with ureteral calculus      (Hydronephrosis with ureteral calculus [N13.2])    Surgeons: Elmer Lowery MD Responsible Provider: Issac Lara DO    Anesthesia Type: general ASA Status: 2            Anesthesia Type: general    Vitals Value Taken Time   /86 03/26/24 1336   Temp 36.5 03/26/24 1339   Pulse 71 03/26/24 1337   Resp 15 03/26/24 1337   SpO2 97 % 03/26/24 1337   Vitals shown include unvalidated device data.    Anesthesia Post Evaluation    Patient location during evaluation: PACU  Patient participation: complete - patient participated  Level of consciousness: awake and alert  Pain management: satisfactory to patient  Airway patency: patent  Cardiovascular status: acceptable and hemodynamically stable  Respiratory status: acceptable, spontaneous ventilation, room air and nonlabored ventilation  Hydration status: acceptable  Postoperative Nausea and Vomiting: none        No notable events documented.

## 2024-03-31 LAB
APPEARANCE STONE: NORMAL
COMPN STONE: NORMAL
SPECIMEN WT: 3 MG

## 2024-03-31 NOTE — PROGRESS NOTES
CC: Constipation, GERD, epigastric discomfort    History of Present Illness:   Gage Hernandes is a 33 y.o. male with a PMH of anxiety, GERD, nephrolithiasis who presents to clinic for constipation, GERD and epigastric discomfort.  Patient states that he gets occasional GERD and epigastric discomfort.  He thinks the 2 may be related.  He takes Tums and PPI as needed.  He feels like it helps to a certain degree.  He also gets occasional constipation.  He tried MiraLAX once last week.  Has not tried anything other than that.  No other GI concerns at this time.  Rare NSAID use.  Patient does not drink pop.  Some dairy products.  Drinks a lot of water.    CT A/P 3/2024: kidney stone.   Abdominal US 3/2024: kidney stones.     Review of Systems  ROS Negative unless otherwise stated above.    Past Medical/Surgical History  Past Medical History:   Diagnosis Date    Anxiety     Nephrolithiasis     Urinary problem in male       Past Surgical History:   Procedure Laterality Date    KIDNEY STONE SURGERY          Social History   reports that he has quit smoking. His smoking use included cigarettes. He uses smokeless tobacco. He reports that he does not currently use alcohol. He reports current drug use. Drug: Marijuana.     Family History  family history is not on file.     Allergies  Allergies   Allergen Reactions    Cephalosporins Rash     Ceclor - rash       Medications  Current Outpatient Medications   Medication Instructions    clonazePAM (KLONOPIN) 0.5 mg, oral, Daily    lamoTRIgine (LAMICTAL) 400 mg, oral, Daily    nitrofurantoin, macrocrystal-monohydrate, (Macrobid) 100 mg capsule 100 mg, oral, 2 times daily    QUEtiapine XR (SEROQUEL XR) 600 mg, oral, Daily        Objective   Visit Vitals  /66   Pulse 106   Resp 18        General: A&Ox3, NAD.  HEENT: AT/NC.   CV: RRR. No murmur.  Resp: CTA bilaterally. No wheezing, rhonchi or rales.   GI: Soft, NT/ND. BSx4.  Extrem: No edema. Pulses intact.  Skin: No Jaundice.    Neuro: No focal deficits.   Psych: Normal mood and affect.     Lab Results   Component Value Date    WBC 6.3 03/12/2024    HGB 14.0 03/12/2024    HCT 40.0 (L) 03/12/2024    MCV 87 03/12/2024     03/12/2024       Chemistry    Lab Results   Component Value Date/Time     (L) 03/12/2024 1007    K 3.8 03/12/2024 1007     03/12/2024 1007    CO2 24 03/12/2024 1007    BUN 16 03/12/2024 1007    CREATININE 1.39 (H) 03/12/2024 1007    Lab Results   Component Value Date/Time    CALCIUM 9.4 03/12/2024 1007    ALKPHOS 92 03/12/2024 1007    AST 17 03/12/2024 1007    ALT 19 03/12/2024 1007    BILITOT 0.7 03/12/2024 1007             ASSESSMENT/PLAN  Gage Hernandes is a 33 y.o. male with a PMH of anxiety, GERD, nephrolithiasis who presents to clinic for constipation, GERD and epigastric discomfort.    GERD, epigastric discomfort  -Start Prilosec 20 mg daily (discussed proper timing).  -Avoid NSAIDs.  -Antireflux lifestyle.  -Consider EGD pending clinical course.    Constipation  -Start daily fiber supplementation.  -MiraLAX as needed.      Mazin Rosa, DO

## 2024-04-01 ENCOUNTER — OFFICE VISIT (OUTPATIENT)
Dept: GASTROENTEROLOGY | Facility: CLINIC | Age: 34
End: 2024-04-01
Payer: COMMERCIAL

## 2024-04-01 VITALS
HEART RATE: 106 BPM | SYSTOLIC BLOOD PRESSURE: 150 MMHG | DIASTOLIC BLOOD PRESSURE: 66 MMHG | HEIGHT: 70 IN | OXYGEN SATURATION: 96 % | BODY MASS INDEX: 25.62 KG/M2 | WEIGHT: 179 LBS | RESPIRATION RATE: 18 BRPM

## 2024-04-01 DIAGNOSIS — K59.00 CONSTIPATION, UNSPECIFIED CONSTIPATION TYPE: ICD-10-CM

## 2024-04-01 DIAGNOSIS — R19.4 CHANGE IN BOWEL HABITS: ICD-10-CM

## 2024-04-01 DIAGNOSIS — K21.9 GASTROESOPHAGEAL REFLUX DISEASE, UNSPECIFIED WHETHER ESOPHAGITIS PRESENT: Primary | ICD-10-CM

## 2024-04-01 DIAGNOSIS — R10.13 EPIGASTRIC ABDOMINAL PAIN: ICD-10-CM

## 2024-04-01 PROCEDURE — 99204 OFFICE O/P NEW MOD 45 MIN: CPT | Performed by: STUDENT IN AN ORGANIZED HEALTH CARE EDUCATION/TRAINING PROGRAM

## 2024-04-01 PROCEDURE — 4004F PT TOBACCO SCREEN RCVD TLK: CPT | Performed by: STUDENT IN AN ORGANIZED HEALTH CARE EDUCATION/TRAINING PROGRAM

## 2024-04-02 ENCOUNTER — APPOINTMENT (OUTPATIENT)
Dept: NEPHROLOGY | Facility: CLINIC | Age: 34
End: 2024-04-02
Payer: COMMERCIAL

## 2024-12-14 ENCOUNTER — APPOINTMENT (OUTPATIENT)
Dept: URGENT CARE | Age: 34
End: 2024-12-14
Payer: COMMERCIAL

## 2025-06-06 ENCOUNTER — HOSPITAL ENCOUNTER (OUTPATIENT)
Dept: RADIOLOGY | Facility: CLINIC | Age: 35
Discharge: HOME | End: 2025-06-06

## 2025-06-06 ENCOUNTER — APPOINTMENT (OUTPATIENT)
Dept: PRIMARY CARE | Facility: CLINIC | Age: 35
End: 2025-06-06

## 2025-06-06 VITALS
BODY MASS INDEX: 24.79 KG/M2 | TEMPERATURE: 98.7 F | HEART RATE: 84 BPM | WEIGHT: 167.4 LBS | OXYGEN SATURATION: 96 % | RESPIRATION RATE: 16 BRPM | HEIGHT: 69 IN

## 2025-06-06 DIAGNOSIS — R10.13 EPIGASTRIC PAIN: ICD-10-CM

## 2025-06-06 DIAGNOSIS — R63.4 WEIGHT LOSS, ABNORMAL: ICD-10-CM

## 2025-06-06 DIAGNOSIS — R19.4 CHANGE IN BOWEL HABIT: Primary | ICD-10-CM

## 2025-06-06 DIAGNOSIS — R19.8 ABDOMINAL FULLNESS IN LEFT UPPER QUADRANT: ICD-10-CM

## 2025-06-06 DIAGNOSIS — R82.90 URINE ABNORMALITY: ICD-10-CM

## 2025-06-06 PROCEDURE — 99214 OFFICE O/P EST MOD 30 MIN: CPT | Performed by: INTERNAL MEDICINE

## 2025-06-06 PROCEDURE — 71046 X-RAY EXAM CHEST 2 VIEWS: CPT

## 2025-06-06 PROCEDURE — 3008F BODY MASS INDEX DOCD: CPT | Performed by: INTERNAL MEDICINE

## 2025-06-06 RX ORDER — OMEPRAZOLE 20 MG/1
20 CAPSULE, DELAYED RELEASE ORAL DAILY
Qty: 30 CAPSULE | Refills: 11 | Status: SHIPPED | OUTPATIENT
Start: 2025-06-06 | End: 2026-06-06

## 2025-06-06 ASSESSMENT — PATIENT HEALTH QUESTIONNAIRE - PHQ9
2. FEELING DOWN, DEPRESSED OR HOPELESS: NOT AT ALL
SUM OF ALL RESPONSES TO PHQ9 QUESTIONS 1 AND 2: 0
1. LITTLE INTEREST OR PLEASURE IN DOING THINGS: NOT AT ALL

## 2025-06-06 ASSESSMENT — PAIN SCALES - GENERAL: PAINLEVEL_OUTOF10: 2

## 2025-06-06 NOTE — PROGRESS NOTES
Subjective   Patient ID: [id number]     Gage Hernandes is a 34 y.o. male who presents for GI Problem (Saw a GI doctor over a year ago. Has some substance leaking in urine and it is green and bowels are yellow.).  Current Medications[1]  History of Present Illness  The patient presents for evaluation of gastrointestinal issues, change in bowel habits and occasional chest pain.    He reports significant gastrointestinal disturbances, including greenish-yellow urine and yellow stools, persistent for one year. He describes audible rumbling in his abdomen without pain.  Occasional discomfort in the epigastric area with worsening acid reflux and sensation of fullness in the epigastric area as described by patient.  Bowel movements vary between soft and normal, twice daily. He is not using MiraLAX. No abdominal pain, nausea, vomiting, fever, or chills. He has night sweats, severe decreased appetite, and weight loss. He reports a sensation of a mass in the epigastric region.  Was seen by gastroenterologist  at [facility] since 04/2024 for constipation, epigastric discomfort, and acid reflux. Advised to start Prilosec on an empty stomach early in the morning, avoid anti-inflammatory medications and MiraLAX, and monitor stool color. Manages acid reflux with Tums and plans to start another cycle of medication. Consumes marijuana without associated nausea or vomiting.    Chest pain for the past two weeks, different from previous sharp left-sided pain. Current pain is localized, occurs with certain arm movements, not severe or sharp. No exertional symptoms, can walk without difficulty. Concerned about potential lung mass due to smoking history. No coughing or wheezing.    Experiences major anxiety daily.    Had a kidney stone, saw a urologist, and had surgery. Went to ER with abdominal and left flank pain, diagnosed with kidney stone. CT scan showed 6 mm proximal stone in left ureter. Admitted for pain control.  "Cystoscopy with stent placement for kidney stone on 03/12/2024.    PAST SURGICAL HISTORY:  Cystoscopy with stent placement for kidney stone on 03/12/2024.    SOCIAL HISTORY  Ex-smoker with a history of smoking for over a decade. Uses marijuana.       Review of system was reviewed all normal except what is noted in HPI   Medical History[2]   Objective   Resp 16   Ht 1.753 m (5' 9\")   Wt 75.9 kg (167 lb 6.4 oz)   BMI 24.72 kg/m²      Physical Exam  Constitutional:       Appearance: Normal appearance.   HENT:      Head: Normocephalic.   Cardiovascular:      Rate and Rhythm: Normal rate and regular rhythm.   Pulmonary:      Effort: Pulmonary effort is normal.      Breath sounds: Normal breath sounds.   Abdominal:      Tenderness: There is abdominal tenderness.      Comments: Tenderness in the epigastric area on palpation noted on examination.  As well as pancreatic enzymes.   Musculoskeletal:      Cervical back: Normal range of motion.   Neurological:      Mental Status: He is alert.   Psychiatric:         Mood and Affect: Mood normal.         Behavior: Behavior normal.         Thought Content: Thought content normal.         Judgment: Judgment normal.           Assessment/Plan   Assessment & Plan  1. Gastrointestinal issues:  - Bright yellow stools, greenish-yellow urine, fullness in upper abdomen, unintentional weight loss (~20 lbs since 02/2024)  - Severe decreased appetite and night sweats  - Diagnostic approach:    - Blood work (liver function, kidney markers, inflammation markers, CBC)    - Urine analysis (bilirubin levels, urine composition, potential infections)    - Stool sample analysis  - Imaging:    - CT scans of chest and abdomen due to weight loss, loss of appetite, epigastric discomfort/fullness and ex-smoker  - Referral to gastroenterologist    2. Chest pain:  - Localized pain triggered by arm movements, suggesting musculoskeletal origin  - No exertional symptoms, cough, wheezing, fever, or chills  - " Chest x-ray to rule out potential lung masses due to smoking history    3. Anxiety:  - Major anxiety daily  - Counseling on impact of anxiety on health and importance of consistent follow-up care  Was discussed initiation of antianxiety medications when he comes back in follow-up within the next 4 to 6 weeks.     We will discuss the results with the patient once available.    Altagracia Henriquez MD   This medical note was created with the assistance of artificial intelligence (AI) for documentation purposes. The content has been reviewed and confirmed by the healthcare provider for accuracy and completeness. Patient consented to the use of audio recording and use of AI during their visit.           [1]   Current Outpatient Medications:     clonazePAM (KlonoPIN) 0.5 mg tablet, Take 1 tablet (0.5 mg) by mouth once daily., Disp: , Rfl:     lamoTRIgine (LaMICtal) 200 mg tablet, Take 2 tablets (400 mg) by mouth once daily., Disp: , Rfl:     nitrofurantoin, macrocrystal-monohydrate, (Macrobid) 100 mg capsule, Take 1 capsule (100 mg) by mouth 2 times a day., Disp: 10 capsule, Rfl: 0    QUEtiapine XR (SEROquel XR) 300 mg 24 hr tablet, Take 2 tablets (600 mg) by mouth once daily., Disp: , Rfl:   [2]   Past Medical History:  Diagnosis Date    Anxiety     Nephrolithiasis     Urinary problem in male

## 2025-06-07 LAB
ALBUMIN SERPL-MCNC: 4.7 G/DL (ref 3.6–5.1)
ALP SERPL-CCNC: 102 U/L (ref 36–130)
ALT SERPL-CCNC: 17 U/L (ref 9–46)
AMYLASE SERPL-CCNC: 33 U/L (ref 21–101)
ANION GAP SERPL CALCULATED.4IONS-SCNC: 8 MMOL/L (CALC) (ref 7–17)
APPEARANCE UR: CLEAR
AST SERPL-CCNC: 15 U/L (ref 10–40)
BILIRUB SERPL-MCNC: 0.5 MG/DL (ref 0.2–1.2)
BILIRUB UR QL STRIP: NEGATIVE
BUN SERPL-MCNC: 13 MG/DL (ref 7–25)
CALCIUM SERPL-MCNC: 9.5 MG/DL (ref 8.6–10.3)
CHLORIDE SERPL-SCNC: 105 MMOL/L (ref 98–110)
CO2 SERPL-SCNC: 28 MMOL/L (ref 20–32)
COLOR UR: YELLOW
CREAT SERPL-MCNC: 1.16 MG/DL (ref 0.6–1.26)
CRP SERPL-MCNC: NORMAL MG/L
EGFRCR SERPLBLD CKD-EPI 2021: 84 ML/MIN/1.73M2
ERYTHROCYTE [DISTWIDTH] IN BLOOD BY AUTOMATED COUNT: 12.4 % (ref 11–15)
ERYTHROCYTE [SEDIMENTATION RATE] IN BLOOD BY WESTERGREN METHOD: 2 MM/H
GLUCOSE SERPL-MCNC: 108 MG/DL (ref 65–99)
GLUCOSE UR QL STRIP: NEGATIVE
HCT VFR BLD AUTO: 41.3 % (ref 38.5–50)
HGB BLD-MCNC: 14 G/DL (ref 13.2–17.1)
HGB UR QL STRIP: NEGATIVE
KETONES UR QL STRIP: NEGATIVE
LEUKOCYTE ESTERASE UR QL STRIP: NEGATIVE
LIPASE SERPL-CCNC: 26 U/L (ref 7–60)
MCH RBC QN AUTO: 31.6 PG (ref 27–33)
MCHC RBC AUTO-ENTMCNC: 33.9 G/DL (ref 32–36)
MCV RBC AUTO: 93.2 FL (ref 80–100)
NITRITE UR QL STRIP: NEGATIVE
PH UR STRIP: 7.5 [PH] (ref 5–8)
PLATELET # BLD AUTO: 225 THOUSAND/UL (ref 140–400)
PMV BLD REES-ECKER: 10.2 FL (ref 7.5–12.5)
POTASSIUM SERPL-SCNC: 4.2 MMOL/L (ref 3.5–5.3)
PROT SERPL-MCNC: 6.5 G/DL (ref 6.1–8.1)
PROT UR QL STRIP: NEGATIVE
RBC # BLD AUTO: 4.43 MILLION/UL (ref 4.2–5.8)
SODIUM SERPL-SCNC: 141 MMOL/L (ref 135–146)
SP GR UR STRIP: 1.01 (ref 1–1.03)
WBC # BLD AUTO: 5.3 THOUSAND/UL (ref 3.8–10.8)

## 2025-06-09 LAB
ALBUMIN SERPL-MCNC: 4.7 G/DL (ref 3.6–5.1)
ALP SERPL-CCNC: 102 U/L (ref 36–130)
ALT SERPL-CCNC: 17 U/L (ref 9–46)
AMYLASE SERPL-CCNC: 33 U/L (ref 21–101)
ANION GAP SERPL CALCULATED.4IONS-SCNC: 8 MMOL/L (CALC) (ref 7–17)
APPEARANCE UR: CLEAR
AST SERPL-CCNC: 15 U/L (ref 10–40)
BILIRUB SERPL-MCNC: 0.5 MG/DL (ref 0.2–1.2)
BILIRUB UR QL STRIP: NEGATIVE
BUN SERPL-MCNC: 13 MG/DL (ref 7–25)
CALCIUM SERPL-MCNC: 9.5 MG/DL (ref 8.6–10.3)
CHLORIDE SERPL-SCNC: 105 MMOL/L (ref 98–110)
CO2 SERPL-SCNC: 28 MMOL/L (ref 20–32)
COLOR UR: YELLOW
CREAT SERPL-MCNC: 1.16 MG/DL (ref 0.6–1.26)
CRP SERPL-MCNC: <3 MG/L
EGFRCR SERPLBLD CKD-EPI 2021: 84 ML/MIN/1.73M2
ERYTHROCYTE [DISTWIDTH] IN BLOOD BY AUTOMATED COUNT: 12.4 % (ref 11–15)
ERYTHROCYTE [SEDIMENTATION RATE] IN BLOOD BY WESTERGREN METHOD: 2 MM/H
GLUCOSE SERPL-MCNC: 108 MG/DL (ref 65–99)
GLUCOSE UR QL STRIP: NEGATIVE
HCT VFR BLD AUTO: 41.3 % (ref 38.5–50)
HGB BLD-MCNC: 14 G/DL (ref 13.2–17.1)
HGB UR QL STRIP: NEGATIVE
KETONES UR QL STRIP: NEGATIVE
LEUKOCYTE ESTERASE UR QL STRIP: NEGATIVE
LIPASE SERPL-CCNC: 26 U/L (ref 7–60)
MCH RBC QN AUTO: 31.6 PG (ref 27–33)
MCHC RBC AUTO-ENTMCNC: 33.9 G/DL (ref 32–36)
MCV RBC AUTO: 93.2 FL (ref 80–100)
NITRITE UR QL STRIP: NEGATIVE
PH UR STRIP: 7.5 [PH] (ref 5–8)
PLATELET # BLD AUTO: 225 THOUSAND/UL (ref 140–400)
PMV BLD REES-ECKER: 10.2 FL (ref 7.5–12.5)
POTASSIUM SERPL-SCNC: 4.2 MMOL/L (ref 3.5–5.3)
PROT SERPL-MCNC: 6.5 G/DL (ref 6.1–8.1)
PROT UR QL STRIP: NEGATIVE
RBC # BLD AUTO: 4.43 MILLION/UL (ref 4.2–5.8)
SODIUM SERPL-SCNC: 141 MMOL/L (ref 135–146)
SP GR UR STRIP: 1.01 (ref 1–1.03)
WBC # BLD AUTO: 5.3 THOUSAND/UL (ref 3.8–10.8)

## 2025-06-24 ENCOUNTER — HOSPITAL ENCOUNTER (OUTPATIENT)
Dept: RADIOLOGY | Facility: CLINIC | Age: 35
Discharge: HOME | End: 2025-06-24
Payer: MEDICARE

## 2025-06-24 DIAGNOSIS — R19.8 ABDOMINAL FULLNESS IN LEFT UPPER QUADRANT: ICD-10-CM

## 2025-06-24 DIAGNOSIS — R19.4 CHANGE IN BOWEL HABIT: ICD-10-CM

## 2025-06-24 DIAGNOSIS — R63.4 WEIGHT LOSS, ABNORMAL: ICD-10-CM

## 2025-06-24 PROCEDURE — 71260 CT THORAX DX C+: CPT | Performed by: RADIOLOGY

## 2025-06-24 PROCEDURE — 74177 CT ABD & PELVIS W/CONTRAST: CPT | Performed by: RADIOLOGY

## 2025-06-24 PROCEDURE — 74177 CT ABD & PELVIS W/CONTRAST: CPT

## 2025-06-24 PROCEDURE — 2550000001 HC RX 255 CONTRASTS: Performed by: INTERNAL MEDICINE

## 2025-06-24 RX ADMIN — IOHEXOL 90 ML: 350 INJECTION, SOLUTION INTRAVENOUS at 10:34

## 2025-06-26 DIAGNOSIS — R93.89 ABNORMAL CT OF THE CHEST: Primary | ICD-10-CM

## 2025-06-26 DIAGNOSIS — R63.4 WEIGHT LOSS: ICD-10-CM

## 2025-06-30 LAB
IGNF NEG CNTRL BLD: NORMAL
M TB IFN-G BLD-IMP: NEGATIVE
MITOGEN IGNF.SPOT COUNT BLD: NORMAL
QUEST PANEL A SPOT COUNT: 4
QUEST PANEL B SPOT COUNT: 0

## 2025-07-01 NOTE — PROGRESS NOTES
Patient: Gage Hernandes    [id number]  : 1990 -- AGE 34 y.o.    Provider: Sadaf MUSTAFA- CNP     [place] [facility] [place] [facility] [place]: [place]              [facility]  New Visit Note      HISTORY OF PRESENT ILLNESS     The patient's referring provider is: No ref. provider found    HISTORY OF PRESENT ILLNESS   Gage Hernandes is a 34 y.o. male with a history of anxiety and GERD, who is a former smoker (~5 pack years), daily nicotine vapor and marijuana smoker, who presents to a [facility] for an initial evaluation for abnormal CT chest.     I have independently interviewed and examined the patient in the office and reviewed available records.    Current History    On today's visit, the patient reports his respiratory status is stable.  He denies cough, wheeze, shortness of breath, chest tightness and ER visits for breathing issues.  He denies any recent travels, sick contacts or respiratory illnesses.  CT chest showing right upper lobe groundglass opacities as well as to left lung nodules largest measuring 4 mm in the left upper lobe.    He does report some left musculoskeletal chest pain for about the past month when stretching left arm out.  He engages in Frisbee golf and thinks this may be the cause.  Pain is reproducible with stretching left arm and denies pain with breathing.    Reports that ever since he has been a child he has had difficulty breathing out of his nose.    Previous pulmonary history: He has no history of recurrent infections, or lung disease as a child.  He had no previous lung hx, never on oxygen or inhaler therapy.     Inhalers/nebulized medications: none    Hospitalization History: He has not been hospitalized over the last year for breathing related problem.    Sleep history: Denies snoring, apnea, feeling tired during the day or taking naps during the day.       ALLERGIES AND  MEDICATIONS     ALLERGIES  Allergies[1]    MEDICATIONS  Current Medications[2]      PAST HISTORY     PAST MEDICAL HISTORY  GERD  Anxiety  Former smoker    PAST SURGICAL HISTORY  Surgical History[3]    IMMUNIZATION HISTORY  Immunization History   Administered Date(s) Administered    DTP 1989, 1990, 1990, 1991, 1995    Hepatitis B vaccine, 19 yrs and under (RECOMBIVAX, ENGERIX) 1998, 1999, 1999    Hib (PRP-D) 1990    Influenza, Unspecified 2005    MMR vaccine, subcutaneous (MMR II) 1990, 12/10/2001    Meningococcal MCV4, Unspecified 2005    OPV 1989, 1990, 1990, 1991, 1995    Td vaccine, age 7 years and older (TDVAX) 2004    Tdap vaccine, age 7 year and older (BOOSTRIX, ADACEL) 2014       SOCIAL HISTORY  Tobacco Smokin - 23 y/o -1/2 ppd - ~5 pack years  Smokeless Tobacco/Vaping: vapes nicotine daily  Illicit drugs: smokes marijuana daily  Alcohol consumption: none  Pets: mendenhall retriever  Living situation: lives at home alone    OCCUPATIONAL/ENVIRONMENTAL HISTORY  Occupation: [employer]  No known exposure to asbestos, silica, beryllium or inhaled metals.  No exposure to birds or exotic animals.    FAMILY HISTORY  Family History[4]  No family history of pulmonary disease.  Maternal grandfather - esophageal cancer  No family history of autoimmune disorders.    REVIEW OF SYSTEMS     REVIEW OF SYSTEMS  Review of Systems    Constitutional: No fever, no chills, no night sweats.    Eyes: No double vision, no floaters, no dry eyes.   ENT: See HPI.   Neck: No neck stiffness.  Cardiovascular: No sharp chest pain, no heart racing, no leg swelling.  Respiratory: as noted in HPI.   Gastrointestinal: No nausea, no vomiting, no diarrhea.   Musculoskeletal: No joint pain, no back pain.   Integumentary: No rashes or sores.  Neurological: No dizziness, no headaches. Sleeping well.  Psychiatric: No mood changes.    Endocrine: No hot flashes, no cold intolerance, weight is stable.  Hematologic: No easy bruising or bleeding.    PHYSICAL EXAM     VITAL SIGNS:   Vitals:    07/02/25 0754   BP: 118/71   Pulse: 75   Temp: 37 °C (98.6 °F)   SpO2: 95%          CURRENT WEIGHT: Body mass index is 24.25 kg/m².    PREVIOUS WEIGHTS:  Wt Readings from Last 3 Encounters:   06/06/25 75.9 kg (167 lb 6.4 oz)   04/01/24 81.2 kg (179 lb)   03/26/24 80.1 kg (176 lb 9.4 oz)       Physical Exam    Constitutional: General appearance: Alert and oriented.  No acute distress. Well developed, well nourished.  Head and face: Symmetric  ENT: external inspection of ear and nose normal. No intranasal polyps. No oropharyngeal exudates.    Oropharynx: normal   Neck: supple, no lymphadenopathy  Pulmonary: Chest is normal. No increased work of breathing or signs of respiratory distress. Clear to auscultation bilaterally - no crackles, wheezing, or rhonchi.   Cardiovascular: Heart rate and rhythm normal. Normal S1, S2 - no murmurs, gallops, or pericardial rub.   Abdomen: Soft, non tender, +BS  Extremities: No edema. No clubbing or cyanosis of the fingernails.    Neurologic: Moves all four extremities   MSK: Normal movements of extremities. Gait normal   Psychiatric: Intact judgement and insight.    RESULTS/DATA     Pulmonary Function Test Results     None on record     Chest Radiograph     XR chest 2 views 06/06/2025    Narrative  Interpreted By:  Edison Ortiz,  STUDY:  XR CHEST 2 VIEWS    INDICATION:  Signs/Symptoms:x smoker and chest pain.    COMPARISON:  None    ACCESSION NUMBER(S):  [id number]    ORDERING CLINICIAN:  MATIAS AJISO    FINDINGS:  No consolidation, effusion, edema, or pneumothorax. Heart size within  normal limits.    Impression  No evidence of acute intrathoracic abnormality.    Signed by: Edison Ortiz 6/7/2025 9:46 AM  Dictation workstation:   [id number]      Chest CT Scan     No results found for this or any previous visit from the  "past 365 days.      Echocardiogram     No results found for this or any previous visit from the past 365 days.       Labwork     Lab Results   Component Value Date    WBC 5.3 06/06/2025    HGB 14.0 06/06/2025    HCT 41.3 06/06/2025    MCV 93.2 06/06/2025     06/06/2025      Lab Results   Component Value Date    GLUCOSE 108 (H) 06/06/2025    CALCIUM 9.5 06/06/2025     06/06/2025    K 4.2 06/06/2025    CO2 28 06/06/2025     06/06/2025    BUN 13 06/06/2025    CREATININE 1.16 06/06/2025      Lab Results   Component Value Date    ALT 17 06/06/2025    AST 15 06/06/2025    ALKPHOS 102 06/06/2025    BILITOT 0.5 06/06/2025        No results found for: \"PROTIME\", \"INR\", \"PTT\"    No results found for: \"ICIGE\", \"IGE\", \"ICA04\", \"ASPFU\", \"IGG\", \"IGA\", \"IGM\"    Peripheral Eosinophil Count/Percentage:   Eosinophils Absolute (x10*3/uL)   Date Value   03/12/2024 0.18     Eosinophils % (%)   Date Value   03/12/2024 2.9       ASSESSMENT/PLAN   Mr. Hernandes is a 34 y.o. male, with a history of anxiety, who is a former smoker (~5 pack years), daily nicotine vapor and marijuana smoker, who presents to a [facility] for an initial evaluation for RUL groundglass opacities.    Problem List and Orders  Problem List Items Addressed This Visit    None  Visit Diagnoses         Sinus congestion    -  Primary    Relevant Orders    Referral to ENT      Ground glass opacity present on imaging of lung        Relevant Orders    Follow Up In Pulmonology    CT chest wo IV contrast            Assessment and Plan / Recommendations:  Problem List Items Addressed This Visit    None     Ground glass opacities RUL  -Will repeat CT chest in 3 months    Chronic sinus congestion  -Referral to ENT    Follow up in 3 months (after CT chest) or sooner if needed.    If you have any questions please call the office [phone]    Thank you for visiting the Pulmonary clinic today!   Sadaf Barajas" CNP  [phone]         [1]   Allergies  Allergen Reactions    Cephalosporins Rash     Ceclor - rash   [2]   Current Outpatient Medications   Medication Sig Dispense Refill    clonazePAM (KlonoPIN) 0.5 mg tablet Take 1 tablet (0.5 mg) by mouth once daily.      lamoTRIgine (LaMICtal) 200 mg tablet Take 2 tablets (400 mg) by mouth once daily.      omeprazole (PriLOSEC) 20 mg DR capsule Take 1 capsule (20 mg) by mouth once daily. Do not crush or chew. 30 capsule 11    QUEtiapine XR (SEROquel XR) 300 mg 24 hr tablet Take 2 tablets (600 mg) by mouth once daily.       No current facility-administered medications for this visit.   [3]   Past Surgical History:  Procedure Laterality Date    KIDNEY STONE SURGERY     [4] No family history on file.

## 2025-07-02 ENCOUNTER — APPOINTMENT (OUTPATIENT)
Facility: CLINIC | Age: 35
End: 2025-07-02
Payer: MEDICARE

## 2025-07-02 VITALS
OXYGEN SATURATION: 95 % | TEMPERATURE: 98.6 F | DIASTOLIC BLOOD PRESSURE: 71 MMHG | SYSTOLIC BLOOD PRESSURE: 118 MMHG | WEIGHT: 169 LBS | HEART RATE: 75 BPM | BODY MASS INDEX: 24.2 KG/M2 | HEIGHT: 70 IN

## 2025-07-02 DIAGNOSIS — R91.8 GROUND GLASS OPACITY PRESENT ON IMAGING OF LUNG: Primary | ICD-10-CM

## 2025-07-02 DIAGNOSIS — R09.81 SINUS CONGESTION: ICD-10-CM

## 2025-07-02 PROCEDURE — 99204 OFFICE O/P NEW MOD 45 MIN: CPT

## 2025-07-02 PROCEDURE — 3008F BODY MASS INDEX DOCD: CPT

## 2025-07-02 ASSESSMENT — PAIN SCALES - GENERAL: PAINLEVEL_OUTOF10: 0-NO PAIN

## 2025-07-02 ASSESSMENT — PATIENT HEALTH QUESTIONNAIRE - PHQ9
2. FEELING DOWN, DEPRESSED OR HOPELESS: NOT AT ALL
1. LITTLE INTEREST OR PLEASURE IN DOING THINGS: NOT AT ALL
SUM OF ALL RESPONSES TO PHQ9 QUESTIONS 1 AND 2: 0

## 2025-07-29 ENCOUNTER — APPOINTMENT (OUTPATIENT)
Facility: CLINIC | Age: 35
End: 2025-07-29
Payer: MEDICARE

## 2025-07-29 VITALS
SYSTOLIC BLOOD PRESSURE: 123 MMHG | DIASTOLIC BLOOD PRESSURE: 74 MMHG | WEIGHT: 169 LBS | BODY MASS INDEX: 24.2 KG/M2 | HEIGHT: 70 IN | TEMPERATURE: 97.6 F

## 2025-07-29 DIAGNOSIS — J34.3 HYPERTROPHY OF BOTH INFERIOR NASAL TURBINATES: ICD-10-CM

## 2025-07-29 DIAGNOSIS — R09.81 CHRONIC NASAL CONGESTION: ICD-10-CM

## 2025-07-29 DIAGNOSIS — J30.9 CHRONIC ALLERGIC RHINITIS: Primary | ICD-10-CM

## 2025-07-29 PROCEDURE — 3008F BODY MASS INDEX DOCD: CPT | Performed by: OTOLARYNGOLOGY

## 2025-07-29 PROCEDURE — 31231 NASAL ENDOSCOPY DX: CPT | Performed by: OTOLARYNGOLOGY

## 2025-07-29 PROCEDURE — 99204 OFFICE O/P NEW MOD 45 MIN: CPT | Performed by: OTOLARYNGOLOGY

## 2025-07-29 RX ORDER — FLUTICASONE PROPIONATE 50 MCG
2 SPRAY, SUSPENSION (ML) NASAL DAILY
Qty: 48 G | Refills: 3 | Status: SHIPPED | OUTPATIENT
Start: 2025-07-29 | End: 2026-07-29

## 2025-07-29 NOTE — PROGRESS NOTES
"Impression:  1. Chronic allergic rhinitis        2. Hypertrophy of both inferior nasal turbinates        3. Chronic nasal congestion             RECOMMENDATIONS/PLAN :  I reassured the patient there is no evidence of any intranasal polyps or infection.  However his nose does appear allergic so we will place him on Flonase nasal spray-2 puffs each nostril daily and I want him to start rinsing his nose using saline rinses.  He will do this for the next couple of months and can otherwise follow-up as needed.      **This electronic medical record note was created with the use of voice recognition software.  Despite proofreading, typographical or grammatical errors may be present that could affect meaning of content **    Subjective   Patient ID:     Gage Hernandes is a 35 y.o. male who presents to the office today complaining of persistent nasal congestion with clear rhinorrhea.  He denies repetitive sinus infections fever chills or any pus draining from the sinuses.  No other upper respiratory issues.  No recent trauma to the nose.  He has not been on any nasal steroid sprays or allergy medications.    ROS:  A detailed 12 system review of systems is noted on the intake form has been reviewed with the patient with details noted in the HPI and scanned into the patient's medical record.    Objective     Medical History[1]     Surgical History[2]     RX Allergies[3]     Current Medications[4]     Tobacco Use: High Risk (7/29/2025)    Patient History     Smoking Tobacco Use: Former     Smokeless Tobacco Use: Current     Passive Exposure: Not on file        Alcohol Use: Not on file        Social History     Substance and Sexual Activity   Drug Use Yes    Types: Marijuana    Comment: occassionally        Physical Exam:  Visit Vitals  /74   Temp 36.4 °C (97.6 °F) (Temporal)   Ht 1.778 m (5' 10\")   Wt 76.7 kg (169 lb)   BMI 24.25 kg/m²   Smoking Status Former   BSA 1.95 m²      General: Patient is alert, " oriented, cooperative in no apparent distress.  Head: Normocephalic, atraumatic.  Eyes: PERRL, EOMI, Conjunctiva is clear. No nystagmus.  Ears: Right Ear-- Pinna is normal.  External auditory canal is patent. Tympanic membrane is [intact, translucent and has good mobility with my pneumatic otoscope. No effusion].  Mastoid is nontender.  Left ear-- Pinna is normal.  External auditory canal is patent. Tympanic membrane is [intact, translucent and has good mobility with my pneumatic otoscope.  No effusion].  Mastoid is nontender.  Nose: Septum is relatively straight with a mild deflection to the left..  No septal perforation or lesions. No septal hematoma/ seroma.  No signs of bleeding.  Inferior turbinates are moderately swollen and pale.   No evidence of intranasal polyps.  No infectious drainage.  Throat:  Floor of mouth is clear, no masses.  Tongue appears normal, no lesions or masses. Gums, gingiva, buccal mucosa appear pink and moist, no lesions. Teeth are in fair repair.  No obvious dental infections.  Peritonsillar regions appear symmetric without swelling.  Hard and soft palate appear normal, no obvious cleft. Uvula is midline.  Oropharynx: No lesions. Retropharyngeal wall is flat.  No active postnasal drip.  Neck: Supple,  no lymphadenopathy.  No masses.  Salivary Glands: Symmetric bilaterally.  No palpable masses.  No evidence of acute infection or salivary stones  Neurologic: Cranial Nerves 2-12 are grossly intact without focal deficits. Cerebellar function testing is normal.     Results:   []    Procedure:   Preoperative diagnosis: Chronic nasal congestion-rule out intranasal polyps  Postoperative diagnosis: same  Procedure: Rigid nasal endoscopy  Surgeon: Jeffrey Luo DO  Assist: None  Anesthesia: 4% topical lidocaine mixed with 0.5% Afrin nasal spray 1:1  EBL: Minimal  Complications: None  Disposition: The patient tolerated the procedure well and there were no complications.  The patient was discharged  home in stable condition.    Procedure:  After informed consent was obtained with the various risks, benefits, complications, and alternatives explained to the patient/ guardian, the patient was sat up in the ENT chair and  both nostrils were sprayed down with topical lidocaine 4% and .05% Afrin solution.   After allowing this to take effect, the Zero degree rigid endoscope was advanced into both nostrils. The following areas were visualized:    Bilateral nasal passages, nasal septum, nasal turbinates, ostiomeatal complexes and sinus ostia, nasopharynx and eustachian tube orifices. All structures were found to be normal except as follows:    Septum has a mild deflection to the left.  Inferior turbinates are swollen and obstructive and they do appear pale and allergic.  Ostiomeatal complexes are clear bilaterally.  No pus polyps or lesions.  Nasopharynx is clear.  No masses.    The patient tolerated the procedure well and there were no complications.    Jeffrey Luo DO, FAOCO    Jeffrey Luo DO        [1]   Past Medical History:  Diagnosis Date    Anxiety     GERD (gastroesophageal reflux disease)     Nephrolithiasis     Urinary problem in male    [2]   Past Surgical History:  Procedure Laterality Date    KIDNEY STONE SURGERY     [3]   Allergies  Allergen Reactions    Cephalosporins Rash     Ceclor - rash   [4]   Current Outpatient Medications:     clonazePAM (KlonoPIN) 0.5 mg tablet, Take 1 tablet (0.5 mg) by mouth once daily., Disp: , Rfl:     lamoTRIgine (LaMICtal) 200 mg tablet, Take 2 tablets (400 mg) by mouth once daily., Disp: , Rfl:     omeprazole (PriLOSEC) 20 mg DR capsule, Take 1 capsule (20 mg) by mouth once daily. Do not crush or chew., Disp: 30 capsule, Rfl: 11    QUEtiapine XR (SEROquel XR) 300 mg 24 hr tablet, Take 2 tablets (600 mg) by mouth once daily., Disp: , Rfl:

## 2025-07-30 ENCOUNTER — APPOINTMENT (OUTPATIENT)
Facility: CLINIC | Age: 35
End: 2025-07-30
Payer: MEDICARE

## 2025-09-04 ENCOUNTER — OFFICE VISIT (OUTPATIENT)
Dept: GASTROENTEROLOGY | Facility: CLINIC | Age: 35
End: 2025-09-04
Payer: MEDICARE

## 2025-09-04 VITALS
SYSTOLIC BLOOD PRESSURE: 111 MMHG | DIASTOLIC BLOOD PRESSURE: 68 MMHG | TEMPERATURE: 98.1 F | HEIGHT: 70 IN | HEART RATE: 93 BPM | WEIGHT: 177 LBS | BODY MASS INDEX: 25.34 KG/M2

## 2025-09-04 DIAGNOSIS — R10.13 EPIGASTRIC PAIN: ICD-10-CM

## 2025-09-04 DIAGNOSIS — R19.7 DIARRHEA, UNSPECIFIED TYPE: Primary | ICD-10-CM

## 2025-09-04 PROCEDURE — 3008F BODY MASS INDEX DOCD: CPT | Performed by: NURSE PRACTITIONER

## 2025-09-04 PROCEDURE — 99212 OFFICE O/P EST SF 10 MIN: CPT

## 2025-09-04 PROCEDURE — 99204 OFFICE O/P NEW MOD 45 MIN: CPT | Performed by: NURSE PRACTITIONER

## 2025-09-04 RX ORDER — WHEAT DEXTRIN 3 G/4 G
POWDER (GRAM) ORAL
Qty: 248 G | Refills: 0 | Status: SHIPPED | OUTPATIENT
Start: 2025-09-04

## 2025-09-04 RX ORDER — OMEPRAZOLE 20 MG/1
40 CAPSULE, DELAYED RELEASE ORAL DAILY
Qty: 60 CAPSULE | Refills: 11 | Status: SHIPPED | OUTPATIENT
Start: 2025-09-04 | End: 2026-09-04

## 2025-09-04 RX ORDER — WHEAT DEXTRIN 3 G/4 G
POWDER (GRAM) ORAL
Qty: 248 G | Refills: 0 | Status: SHIPPED | OUTPATIENT
Start: 2025-09-04 | End: 2025-09-04

## 2025-09-04 ASSESSMENT — ENCOUNTER SYMPTOMS
ROS GI COMMENTS: SEE HPI
CARDIOVASCULAR NEGATIVE: 1
STRIDOR: 0
APNEA: 0
SHORTNESS OF BREATH: 0
HEMATOLOGIC/LYMPHATIC NEGATIVE: 1
DIAPHORESIS: 0
PSYCHIATRIC NEGATIVE: 1
CHEST TIGHTNESS: 0
ENDOCRINE NEGATIVE: 1
ALLERGIC/IMMUNOLOGIC NEGATIVE: 1
EYES NEGATIVE: 1
DIFFICULTY URINATING: 0
WHEEZING: 0
FATIGUE: 0
NEUROLOGICAL NEGATIVE: 1
FEVER: 0
RESPIRATORY NEGATIVE: 1
COUGH: 0
MUSCULOSKELETAL NEGATIVE: 1
CHILLS: 0

## 2025-09-05 ENCOUNTER — APPOINTMENT (OUTPATIENT)
Facility: CLINIC | Age: 35
End: 2025-09-05
Payer: MEDICARE

## 2025-10-02 ENCOUNTER — APPOINTMENT (OUTPATIENT)
Facility: CLINIC | Age: 35
End: 2025-10-02
Payer: MEDICARE

## 2025-10-20 ENCOUNTER — APPOINTMENT (OUTPATIENT)
Dept: GASTROENTEROLOGY | Facility: CLINIC | Age: 35
End: 2025-10-20
Payer: MEDICARE

## (undated) DEVICE — CATHETER, URETERAL, POLLACK, OPEN END, 5.5 FR, 70 CM

## (undated) DEVICE — NEEDLE, HYPODERMIC, SPECIALTY, REGULAR WALL, SHORT BEVEL, 18 G X 1.5 IN

## (undated) DEVICE — BASKET, STONE, RETRIEVAL, NITINOL (4WIRE, 1.9 0 TIP)

## (undated) DEVICE — SCOPE, LITHOVUE SUD ONLY, GLOBAL STANDARD

## (undated) DEVICE — HOLMIUM 200 FORTEC FIBER

## (undated) DEVICE — SHEATH, URETERAL ACCESS, NAVIGATOR 11/13FR X 36CM

## (undated) DEVICE — SOLUTION, IRRIGATION, SODIUM CHLORIDE 0.9%, 1000 ML, POUR BOTTLE

## (undated) DEVICE — DRAPE PACK, LAVH, W/ATTACHED LEGGINGS, W/POUCH, 100 X 114 IN, LF, STERILE

## (undated) DEVICE — GUIDEWIRE, AMPLATZ, SUPER STIFF, 035 STRAIGHT

## (undated) DEVICE — Y-ADAPTER, GATEWAY ADVANTAGE

## (undated) DEVICE — GLOVE, SURGICAL, PROTEXIS PI ORTHO, 7.0, PF, LF

## (undated) DEVICE — DILATOR SET, COAXIAL STYLET, 8/10

## (undated) DEVICE — SOLUTION, IRRIGATION, STERILE WATER, 1000 ML, POUR BOTTLE

## (undated) DEVICE — TOWEL PACK, STERILE, 4/PACK, BLUE

## (undated) DEVICE — GLOVE, SURGICAL, PROTEXIS PI W/NEU-THERA, 7.5, PF, LF

## (undated) DEVICE — CATHETER, DUAL LUMEN, UDC/10/50 M

## (undated) DEVICE — GUIDEWIRE, ZIPWIRE, STANDARD, 0.035 X 150CM, ANGLED TIP

## (undated) DEVICE — SYRINGE, LUER LOCK, 12ML

## (undated) DEVICE — Device

## (undated) DEVICE — IRRIGATION KIT, PUMPING, SINGLE ACTION, SYRINGE, 10 CC

## (undated) DEVICE — GUIDEWIRE, DUAL SENSOR, .035 X 150 STRAIGHT,  3CM

## (undated) DEVICE — 0.035IN X 15CM SENSOR NITINOL WIRE, W/HYDROPHILIC TIP